# Patient Record
Sex: MALE | Race: WHITE | NOT HISPANIC OR LATINO | Employment: OTHER | URBAN - METROPOLITAN AREA
[De-identification: names, ages, dates, MRNs, and addresses within clinical notes are randomized per-mention and may not be internally consistent; named-entity substitution may affect disease eponyms.]

---

## 2022-04-04 NOTE — PROGRESS NOTES
Electrophysiology Clinic Consult     Shalom Jarvis  1955  [unfilled]  [unfilled]    04/06/22    DATE OF ADMISSION: (Not on file)  [facility] CARDIOLOGY    Roberto Fields,   [address] [facility] [address]  Referring Provider: Trung Borrego     Chief Complaint   Patient presents with   • Atrial Fibrillation     Referring provider: Dr. Borrego    Problem List: Afib, CHF, BSC BIV- ICD    1. Atrial Fibrillation   a. CHADSVASC: 4 (age, CHF, HTN, DM), on Xarelto  b. 11/23/21 ECV: unsuccessful with subsequent Amiodarone initiation   c. 3/16/22 ECV successful while on Amiodarone   2. NICM/CHF  a. C 4/6/2018: Ventriculography 25-30%, No obstructive CAD   b. 2018- Implant of [facility] DDD - ICD   c. Echo 5/3/21 LVEF 20-25%, severe global hypokinesis of the LV, LV chamber severely dilated.   d. 8/24/21 LVEF 30-35%, mod LA dilation, Mild MR and TR  e. 12/9/2021 upgrade to BIV ICD per Dr. Borrego   3. History of Conduction system disease   4. HTN  5. HLD  6. DM  7. COPD with home 2L 02 use at night   8. Chronic Back Pain   9. Surgical History   a. Right total knee replacement.     History of Present Illness:     Mr. Jarvis is a 66 year old male referred by Dr. Borrego for consultation for persistent Atrial Fibrillation. He has had history of NICM and Chronic systolic heart failure. He underwent initial ICD implant in 2018. Patient first diagnosed with AF in November 2021. Patient tells me he was at his regular f/u with Dr. Borrego where he was feeling more fatigued than usual, on [facility] device interrogation he was found to be at 100% AF (25% previously 3 months prior). He underwent ECV that was unsuccessful and ultimately underwent upgrade to BIV-ICD in Dec 2021 due to persistently low EF. He then underwent Amiodarone loading and initiation following his surgery and underwent successful ECV in March with Dr. Borrego. Patient is feeling better since maintaining NSR. He does have mild  SOB but relates to losing stamina. He also has arthritis. Denies any palps, LH/Dizziness. Doing well on xarelto without any bleeding issues. No CVA symptoms. BP stable at home. Overall patient is feeling better.     Caffeine-12 cups of coffee  Tobacco-1 1/2 ppd x 50 years   EtOh-none   Stimulants-none   RICHARD-never tested (naps frequently, does snore but is less than prior) / uses 02 at night  Illicit drug use - none     Allergies   Allergen Reactions   • Dulaglutide Other (See Comments)   • Shellfish Allergy Unknown (See Comments)        Cannot display prior to admission medications because the patient has not been admitted in this contact.            Current Outpatient Medications:   •  amiodarone (PACERONE) 200 MG tablet, Take 200 mg by mouth Daily., Disp: , Rfl:   •  atorvastatin (LIPITOR) 20 MG tablet, Take 20 mg by mouth Daily., Disp: , Rfl:   •  bumetanide (BUMEX) 2 MG tablet, Take 2 mg by mouth 2 (Two) Times a Day., Disp: , Rfl:   •  busPIRone (BUSPAR) 10 MG tablet, Take 10 mg by mouth 3 (Three) Times a Day., Disp: , Rfl:   •  carvedilol (COREG) 25 MG tablet, Take 25 mg by mouth 2 (Two) Times a Day., Disp: , Rfl:   •  diazePAM (VALIUM) 10 MG tablet, As Needed., Disp: , Rfl:   •  DULoxetine (CYMBALTA) 60 MG capsule, Take 60 mg by mouth Daily., Disp: , Rfl:   •  Entresto 49-51 MG tablet, Take 1 tablet by mouth 2 (Two) Times a Day., Disp: , Rfl:   •  fenofibrate (TRICOR) 145 MG tablet, Take 145 mg by mouth every night at bedtime., Disp: , Rfl:   •  insulin aspart (NovoLOG FlexPen) 100 UNIT/ML solution pen-injector sc pen, 3 (Three) Times a Day., Disp: , Rfl:   •  Insulin Degludec (Tresiba FlexTouch) 200 UNIT/ML solution pen-injector pen injection, Daily., Disp: , Rfl:   •  metFORMIN (GLUCOPHAGE) 1000 MG tablet, Take 1,000 mg by mouth 2 (Two) Times a Day., Disp: , Rfl:   •  multivitamin (MULTI-VITAMIN PO), Take 1 tablet by mouth Daily., Disp: , Rfl:   •  rivaroxaban (XARELTO) 20 MG tablet, Take 20 mg by mouth  "Daily., Disp: , Rfl:   •  spironolactone (ALDACTONE) 25 MG tablet, Take 25 mg by mouth 2 (Two) Times a Day., Disp: , Rfl:     Social History     Socioeconomic History   • Marital status:    Tobacco Use   • Smoking status: Current Some Day Smoker     Packs/day: 1.50   • Smokeless tobacco: Never Used   Substance and Sexual Activity   • Alcohol use: Not Currently   • Drug use: Never   • Sexual activity: Defer       Family History   Problem Relation Age of Onset   • Stroke Father        REVIEW OF SYSTEMS:   CONST:  No weight loss, fever, chills, weakness + fatigue.   HEENT:  No visual loss, blurred vision, double vision, yellow sclerae.                   No hearing loss, congestion, sore throat.   SKIN:      No rashes, urticaria, ulcers, sores.     RESP:     + shortness of breath,- hemoptysis, cough, sputum.   GI:           No anorexia, nausea, vomiting, diarrhea. No abdominal pain, melena.   :         No burning on urination, hematuria or increased frequency.  ENDO:    No diaphoresis, cold or heat intolerance. No polyuria or polydipsia.   NEURO:  No headache, dizziness, syncope, paralysis, ataxia, or parasthesias.                  No change in bowel or bladder control. No history of CVA/TIA  MUSC:    + muscle, back pain, joint pain and stiffness.   HEME:    No anemia, bleeding, bruising. No history of DVT/PE.  PSYCH:  No history of depression, anxiety    Vitals:    04/06/22 0959   BP: 104/64   BP Location: Left arm   Patient Position: Sitting   Pulse: 97   SpO2: 97%   Weight: (!) 154 kg (339 lb)   Height: 190.5 cm (75\")                 Physical Exam:  GEN: Well nourished, well-developed, no acute distress  HEENT: Normocephalic, atraumatic, PERRLA, moist mucous membranes  NECK: Supple, NO JVD, no thyromegaly, no lymphadenopathy  CARD: S1S2, RRR, + S3 + S4PMI not appreciated  LUNGS: Clear to auscultation, normal respiratory effort  ABDOMEN: Soft, nontender, normal bowel sounds  EXTREMITIES: No gross deformities, " no clubbing, cyanosis, + edema  SKIN: Warm, dry + ecchymoses  NEURO: No focal deficits, alert and oriented x 3  PSYCHIATRIC: Normal affect and mood      I personally viewed and interpreted the patient's EKG/Telemetry/lab data    No results found for: GLUCOSE, CALCIUM, NA, K, CO2, CL, BUN, CREATININE, EGFRIFAFRI, EGFRIFNONA, BCR, ANIONGAP  No results found for: WBC, HGB, HCT, MCV, PLT  No results found for: INR, PROTIME  No results found for: TSH, L7KMPIA, N4UALVO, THYROIDAB      Manual Device Interrogation: BSC BiV-ICD, DDDR @ 60, Rap 14%, RVp 100%. NSR (since 3/14) 6.5 years on battery.       ECG 12 Lead    Date/Time: 4/6/2022 10:22 AM  Performed by: Freddy Burgess MD  Authorized by: Freddy Burgess MD   Previous ECG: no previous ECG available  Rhythm: sinus rhythm  Rate: normal  BPM: 85  Comments: BiV paced              ICD-10-CM ICD-9-CM   1. Persistent atrial fibrillation (HCC)  I48.19 427.31   2. NICM (nonischemic cardiomyopathy) ([facility])  I42.8 425.4       Assessment and Plan:     1. Persistent Atrial Fibrillation   -CHADSVASC: 4 on xarelto.  Continue  -Found to be in 100% AF fall 2021 (up from 25% in August 2021). He underwent ECV last fall which was not successful. Repeat ECV March 2021 after Amiodarone loading was successful and he is now maintaining NSR. He feels better in NSR. Given his relatively young age and Hx of Lung disease Amiodarone is not a good long term option for him.   -Dr. Burgess discussed AF in detail with patient including symptoms, risks, and treatment options (tikosyn vs ablation). Would recommend stopping amiodarone at this time for wash out and initiate on Tikosyn. Will have patient f/u in [place] in 6 weeks and assess AF burden. The risks, benefits, and alternatives of the procedure have been reviewed and the patient wishes to proceed. Will assess how patient does on Tikosyn and can consider RFA in the future as indicated.     2. NICM/ Chronic Systolic Heart Failure/Class  II-III CHF  -8/24/21 LVEF 30-35%, mod LA dilation, Mild MR and TR  -Upgrade to BiV ICD in December 2021, no shocks.   -On Guideline Directed Medical including Coreg, Entresto, Bumex, Aldactone  -Normally functioning [employer] BIV- ICD.      3. HTN: stable on meds    F/u in 6 weeks in [facility]     Scribed for Freddy Burgess MD by NAN Vicente. 4/6/2022 10:22 EDT     I, Freddy Burgess MD, personally performed the services described in this documentation as scribed by the above named individual in my presence, and it is both accurate and complete.  4/6/2022  10:56 EDT

## 2022-04-06 ENCOUNTER — OFFICE VISIT (OUTPATIENT)
Dept: CARDIOLOGY | Facility: CLINIC | Age: 67
End: 2022-04-06

## 2022-04-06 VITALS
HEIGHT: 75 IN | SYSTOLIC BLOOD PRESSURE: 104 MMHG | BODY MASS INDEX: 39.17 KG/M2 | HEART RATE: 97 BPM | DIASTOLIC BLOOD PRESSURE: 64 MMHG | WEIGHT: 315 LBS | OXYGEN SATURATION: 97 %

## 2022-04-06 DIAGNOSIS — I10 PRIMARY HYPERTENSION: ICD-10-CM

## 2022-04-06 DIAGNOSIS — I42.8 NICM (NONISCHEMIC CARDIOMYOPATHY): ICD-10-CM

## 2022-04-06 DIAGNOSIS — I50.23 CHF (CONGESTIVE HEART FAILURE), NYHA CLASS III, ACUTE ON CHRONIC, SYSTOLIC: ICD-10-CM

## 2022-04-06 DIAGNOSIS — I48.19 PERSISTENT ATRIAL FIBRILLATION: Primary | ICD-10-CM

## 2022-04-06 PROCEDURE — 99204 OFFICE O/P NEW MOD 45 MIN: CPT | Performed by: INTERNAL MEDICINE

## 2022-04-06 PROCEDURE — 93284 PRGRMG EVAL IMPLANTABLE DFB: CPT | Performed by: INTERNAL MEDICINE

## 2022-04-06 PROCEDURE — 93000 ELECTROCARDIOGRAM COMPLETE: CPT | Performed by: INTERNAL MEDICINE

## 2022-04-06 RX ORDER — SPIRONOLACTONE 25 MG/1
25 TABLET ORAL 2 TIMES DAILY
COMMUNITY
Start: 2022-02-18

## 2022-04-06 RX ORDER — DULOXETIN HYDROCHLORIDE 60 MG/1
60 CAPSULE, DELAYED RELEASE ORAL DAILY
COMMUNITY
Start: 2022-04-02

## 2022-04-06 RX ORDER — BUMETANIDE 2 MG/1
2 TABLET ORAL 2 TIMES DAILY
COMMUNITY
Start: 2022-02-18

## 2022-04-06 RX ORDER — CARVEDILOL 25 MG/1
25 TABLET ORAL 2 TIMES DAILY
COMMUNITY
Start: 2022-01-28

## 2022-04-06 RX ORDER — DIPHENOXYLATE HYDROCHLORIDE AND ATROPINE SULFATE 2.5; .025 MG/1; MG/1
1 TABLET ORAL DAILY
COMMUNITY

## 2022-04-06 RX ORDER — FENOFIBRATE 145 MG/1
145 TABLET, COATED ORAL
COMMUNITY
Start: 2022-03-11

## 2022-04-06 RX ORDER — DIAZEPAM 10 MG/1
TABLET ORAL NIGHTLY PRN
COMMUNITY
Start: 2022-03-11

## 2022-04-06 RX ORDER — SACUBITRIL AND VALSARTAN 49; 51 MG/1; MG/1
1 TABLET, FILM COATED ORAL 2 TIMES DAILY
COMMUNITY
Start: 2022-03-10

## 2022-04-06 RX ORDER — AMIODARONE HYDROCHLORIDE 200 MG/1
200 TABLET ORAL DAILY
COMMUNITY
Start: 2022-02-10 | End: 2022-05-06 | Stop reason: ALTCHOICE

## 2022-04-06 RX ORDER — ATORVASTATIN CALCIUM 20 MG/1
20 TABLET, FILM COATED ORAL DAILY
COMMUNITY
Start: 2022-04-02

## 2022-04-06 RX ORDER — INSULIN ASPART 100 [IU]/ML
INJECTION, SOLUTION INTRAVENOUS; SUBCUTANEOUS 3 TIMES DAILY
COMMUNITY
Start: 2022-02-01

## 2022-04-06 RX ORDER — INSULIN DEGLUDEC 200 U/ML
50 INJECTION, SOLUTION SUBCUTANEOUS
COMMUNITY
Start: 2022-02-01

## 2022-04-06 RX ORDER — BUSPIRONE HYDROCHLORIDE 10 MG/1
10 TABLET ORAL 3 TIMES DAILY
COMMUNITY
Start: 2022-03-07

## 2022-05-06 ENCOUNTER — OFFICE VISIT (OUTPATIENT)
Dept: CARDIOLOGY | Facility: CLINIC | Age: 67
End: 2022-05-06

## 2022-05-06 VITALS
HEART RATE: 98 BPM | WEIGHT: 315 LBS | SYSTOLIC BLOOD PRESSURE: 138 MMHG | BODY MASS INDEX: 39.17 KG/M2 | DIASTOLIC BLOOD PRESSURE: 74 MMHG | OXYGEN SATURATION: 97 % | HEIGHT: 75 IN

## 2022-05-06 DIAGNOSIS — I50.22 CHRONIC SYSTOLIC CONGESTIVE HEART FAILURE: ICD-10-CM

## 2022-05-06 DIAGNOSIS — I48.19 ATRIAL FIBRILLATION, PERSISTENT: Primary | ICD-10-CM

## 2022-05-06 DIAGNOSIS — I10 PRIMARY HYPERTENSION: ICD-10-CM

## 2022-05-06 DIAGNOSIS — I42.0 DCM (DILATED CARDIOMYOPATHY): ICD-10-CM

## 2022-05-06 PROCEDURE — 99213 OFFICE O/P EST LOW 20 MIN: CPT | Performed by: INTERNAL MEDICINE

## 2022-05-06 PROCEDURE — 93284 PRGRMG EVAL IMPLANTABLE DFB: CPT | Performed by: INTERNAL MEDICINE

## 2022-05-06 NOTE — PROGRESS NOTES
Shalom NGUYEN Matheus  1955  [phone]    05/06/2022    [facility] CARDIOLOGY     Referring Provider: No ref. provider found     Roberto Fields DO  [address] [place] [address]    Chief Complaint   Patient presents with   • Atrial Fibrillation       Problem List:     1. Atrial Fibrillation   a. CHADSVASC: 4 (age, CHF, HTN, DM), on Xarelto  b. 11/23/21 ECV: unsuccessful with subsequent Amiodarone initiation   c. 3/16/22 ECV successful while on Amiodarone   2. NICM/CHF  a. [facility] 4/6/2018: Ventriculography 25-30%, No obstructive CAD   b. 2018- [place] [facility] [place]   c. Echo 5/3/21 LVEF 20-25%, severe global hypokinesis of the LV, LV chamber severely dilated.   d. 8/24/21 LVEF 30-35%, mod LA dilation, Mild MR and TR  e. 12/9/2021 upgrade to BIV ICD per Dr. Borrego   3. History of Conduction system disease   4. HTN  5. HLD  6. DM  7. COPD with home 2L 02 use at night   8. Chronic Back Pain   9. Surgical History   a. Right total knee replacement.     Allergies  Allergies   Allergen Reactions   • Dulaglutide Other (See Comments)   • Shellfish Allergy Unknown (See Comments)       Current Medications    Current Outpatient Medications:   •  atorvastatin (LIPITOR) 20 MG tablet, Take 20 mg by mouth Daily., Disp: , Rfl:   •  bumetanide (BUMEX) 2 MG tablet, Take 2 mg by mouth 2 (Two) Times a Day., Disp: , Rfl:   •  busPIRone (BUSPAR) 10 MG tablet, Take 10 mg by mouth 3 (Three) Times a Day., Disp: , Rfl:   •  carvedilol (COREG) 25 MG tablet, Take 25 mg by mouth 2 (Two) Times a Day., Disp: , Rfl:   •  diazePAM (VALIUM) 10 MG tablet, As Needed., Disp: , Rfl:   •  DULoxetine (CYMBALTA) 60 MG capsule, Take 60 mg by mouth Daily., Disp: , Rfl:   •  Entresto 49-51 MG tablet, Take 1 tablet by mouth 2 (Two) Times a Day., Disp: , Rfl:   •  fenofibrate (TRICOR) 145 MG tablet, Take 145 mg by mouth every night at bedtime., Disp: , Rfl:   •  insulin aspart (NovoLOG FlexPen) 100 UNIT/ML solution pen-injector  "sc pen, 3 (Three) Times a Day., Disp: , Rfl:   •  Insulin Degludec (Tresiba FlexTouch) 200 UNIT/ML solution pen-injector pen injection, Daily., Disp: , Rfl:   •  metFORMIN (GLUCOPHAGE) 1000 MG tablet, Take 1,000 mg by mouth 2 (Two) Times a Day., Disp: , Rfl:   •  multivitamin (THERAGRAN) tablet tablet, Take 1 tablet by mouth Daily., Disp: , Rfl:   •  rivaroxaban (XARELTO) 20 MG tablet, Take 20 mg by mouth Daily., Disp: , Rfl:   •  spironolactone (ALDACTONE) 25 MG tablet, Take 25 mg by mouth 2 (Two) Times a Day., Disp: , Rfl:     History of Present Illness     Pt presents for follow up of AF/CHF/DCM. Since we last saw the pt, pt stopped his amiodarone without difficulties.  He has had no tachypalpitations light has no syncope or syncope.  He does complain of persistent fatigue and generalized weakness.  He feels that his breathing is improved off of the amiodarone therapy.  No hospitalizations or ER visits.  Pressures been stable at home.    ROS:  General: + fatigue, no weight gain or loss  Cardiovascular:  Denies CP, PND, syncope, near syncope, edema or palpitations.  Pulmonary: + KOVACS, no cough, or wheezing      Vitals:    05/06/22 0956   BP: 138/74   BP Location: Left arm   Patient Position: Sitting   Pulse: 98   SpO2: 97%   Weight: (!) 150 kg (330 lb)   Height: 190.5 cm (75\")     Body mass index is 41.25 kg/m².  PE:  General: NAD  Neck: no JVD, no carotid bruits, no TM  Heart RRR, NL S1, S2, S4 present, no rubs, murmurs  Lungs: CTA, no wheezes, rhonchi, or rales  Abd: soft, non-tender, NL BS  Ext: No musculoskeletal deformities, + edema, No cyanosis, or clubbing  Psych: normal mood and affect    Diagnostic Data:    Manual Device Interrogation: BSC BiV-ICD, DDDR @ 60, Rap 16%, RVp 100%. NSR  6.5 years on battery.     Procedures    1. Atrial fibrillation, persistent (HCC)    2. DCM (dilated cardiomyopathy) (HCC)    3. Chronic systolic congestive heart failure (HCC)    4. Primary hypertension  "         Plan:    Assessment and Plan:      1. Persistent Atrial Fibrillation   -CHADSVASC: 4 on xarelto.   Maintaining sinus rhythm off amiodarone.  He is now been off amiodarone for approximately 5 weeks.  We discussed options with him today.  Tikosyn versus pulmonary vein ablation.  Patient like to pursue Tikosyn admission and therapy.  We will set this up in approximately 2 months.    2. NICM/ Chronic Systolic Heart Failure/Class II-III CHF  -8/24/21 LVEF 30-35%, mod LA dilation, Mild MR and TR  -Upgrade to BiV ICD in December 2021, no shocks.   -On Guideline Directed Medical including Coreg, Entresto, Bumex, Aldactone  Per Dr. Isaiah Bruno.     3. HTN: stable on meds    F/up in 6 months

## 2022-07-12 ENCOUNTER — HOSPITAL ENCOUNTER (INPATIENT)
Facility: HOSPITAL | Age: 67
LOS: 2 days | Discharge: HOME | End: 2022-07-14
Attending: INTERNAL MEDICINE | Admitting: INTERNAL MEDICINE

## 2022-07-12 PROBLEM — I48.91 AFIB: Status: ACTIVE | Noted: 2022-07-12

## 2022-07-12 LAB
ANION GAP SERPL CALCULATED.3IONS-SCNC: 10 MMOL/L (ref 5–15)
BUN SERPL-MCNC: 24 MG/DL (ref 8–23)
BUN/CREAT SERPL: 15.9 (ref 7–25)
CA-I SERPL ISE-MCNC: 1.32 MMOL/L (ref 1.12–1.32)
CALCIUM SPEC-SCNC: 9.4 MG/DL (ref 8.6–10.5)
CHLORIDE SERPL-SCNC: 101 MMOL/L (ref 98–107)
CO2 SERPL-SCNC: 26 MMOL/L (ref 22–29)
CREAT SERPL-MCNC: 1.51 MG/DL (ref 0.76–1.27)
EGFRCR SERPLBLD CKD-EPI 2021: 50.3 ML/MIN/1.73
GLUCOSE SERPL-MCNC: 236 MG/DL (ref 65–99)
INR PPP: 1.02 (ref 0.84–1.13)
MAGNESIUM SERPL-MCNC: 1.7 MG/DL (ref 1.6–2.4)
POTASSIUM SERPL-SCNC: 4.2 MMOL/L (ref 3.5–5.2)
PROTHROMBIN TIME: 13.3 SECONDS (ref 11.4–14.4)
SODIUM SERPL-SCNC: 137 MMOL/L (ref 136–145)

## 2022-07-12 PROCEDURE — 93010 ELECTROCARDIOGRAM REPORT: CPT | Performed by: INTERNAL MEDICINE

## 2022-07-12 PROCEDURE — 83735 ASSAY OF MAGNESIUM: CPT | Performed by: NURSE PRACTITIONER

## 2022-07-12 PROCEDURE — 93005 ELECTROCARDIOGRAM TRACING: CPT | Performed by: INTERNAL MEDICINE

## 2022-07-12 PROCEDURE — 93005 ELECTROCARDIOGRAM TRACING: CPT | Performed by: NURSE PRACTITIONER

## 2022-07-12 PROCEDURE — 63710000001 INSULIN LISPRO (HUMAN) PER 5 UNITS: Performed by: NURSE PRACTITIONER

## 2022-07-12 PROCEDURE — 80048 BASIC METABOLIC PNL TOTAL CA: CPT | Performed by: NURSE PRACTITIONER

## 2022-07-12 PROCEDURE — 85610 PROTHROMBIN TIME: CPT | Performed by: NURSE PRACTITIONER

## 2022-07-12 PROCEDURE — 82330 ASSAY OF CALCIUM: CPT | Performed by: NURSE PRACTITIONER

## 2022-07-12 RX ORDER — NICOTINE POLACRILEX 4 MG
15 LOZENGE BUCCAL
Status: DISCONTINUED | OUTPATIENT
Start: 2022-07-12 | End: 2022-07-14 | Stop reason: HOSPADM

## 2022-07-12 RX ORDER — POTASSIUM CHLORIDE 7.45 MG/ML
10 INJECTION INTRAVENOUS
Status: DISCONTINUED | OUTPATIENT
Start: 2022-07-12 | End: 2022-07-12 | Stop reason: SDUPTHER

## 2022-07-12 RX ORDER — MAGNESIUM SULFATE HEPTAHYDRATE 40 MG/ML
2 INJECTION, SOLUTION INTRAVENOUS AS NEEDED
Status: DISCONTINUED | OUTPATIENT
Start: 2022-07-12 | End: 2022-07-12 | Stop reason: SDUPTHER

## 2022-07-12 RX ORDER — MAGNESIUM SULFATE HEPTAHYDRATE 40 MG/ML
2 INJECTION, SOLUTION INTRAVENOUS AS NEEDED
Status: DISCONTINUED | OUTPATIENT
Start: 2022-07-12 | End: 2022-07-14 | Stop reason: HOSPADM

## 2022-07-12 RX ORDER — CARVEDILOL 12.5 MG/1
25 TABLET ORAL 2 TIMES DAILY
Status: DISCONTINUED | OUTPATIENT
Start: 2022-07-12 | End: 2022-07-14 | Stop reason: HOSPADM

## 2022-07-12 RX ORDER — NICOTINE 21 MG/24HR
1 PATCH, TRANSDERMAL 24 HOURS TRANSDERMAL
Status: DISCONTINUED | OUTPATIENT
Start: 2022-07-12 | End: 2022-07-14 | Stop reason: HOSPADM

## 2022-07-12 RX ORDER — SPIRONOLACTONE 25 MG/1
25 TABLET ORAL 2 TIMES DAILY
Status: DISCONTINUED | OUTPATIENT
Start: 2022-07-12 | End: 2022-07-14 | Stop reason: HOSPADM

## 2022-07-12 RX ORDER — POTASSIUM CHLORIDE 1.5 G/1.77G
40 POWDER, FOR SOLUTION ORAL AS NEEDED
Status: DISCONTINUED | OUTPATIENT
Start: 2022-07-12 | End: 2022-07-14 | Stop reason: HOSPADM

## 2022-07-12 RX ORDER — MAGNESIUM SULFATE HEPTAHYDRATE 40 MG/ML
4 INJECTION, SOLUTION INTRAVENOUS AS NEEDED
Status: DISCONTINUED | OUTPATIENT
Start: 2022-07-12 | End: 2022-07-12 | Stop reason: SDUPTHER

## 2022-07-12 RX ORDER — INSULIN LISPRO 100 [IU]/ML
0-24 INJECTION, SOLUTION INTRAVENOUS; SUBCUTANEOUS
Status: DISCONTINUED | OUTPATIENT
Start: 2022-07-12 | End: 2022-07-14 | Stop reason: HOSPADM

## 2022-07-12 RX ORDER — POTASSIUM CHLORIDE 750 MG/1
40 CAPSULE, EXTENDED RELEASE ORAL AS NEEDED
Status: DISCONTINUED | OUTPATIENT
Start: 2022-07-12 | End: 2022-07-14 | Stop reason: HOSPADM

## 2022-07-12 RX ORDER — DIAZEPAM 5 MG/1
10 TABLET ORAL NIGHTLY
Status: DISCONTINUED | OUTPATIENT
Start: 2022-07-12 | End: 2022-07-14 | Stop reason: HOSPADM

## 2022-07-12 RX ORDER — ATORVASTATIN CALCIUM 20 MG/1
20 TABLET, FILM COATED ORAL NIGHTLY
Status: DISCONTINUED | OUTPATIENT
Start: 2022-07-12 | End: 2022-07-14 | Stop reason: HOSPADM

## 2022-07-12 RX ORDER — BUMETANIDE 2 MG/1
2 TABLET ORAL 2 TIMES DAILY
Status: DISCONTINUED | OUTPATIENT
Start: 2022-07-12 | End: 2022-07-14 | Stop reason: HOSPADM

## 2022-07-12 RX ORDER — DOFETILIDE 0.5 MG/1
500 CAPSULE ORAL EVERY 12 HOURS
Status: DISCONTINUED | OUTPATIENT
Start: 2022-07-12 | End: 2022-07-14 | Stop reason: HOSPADM

## 2022-07-12 RX ORDER — MAGNESIUM SULFATE HEPTAHYDRATE 40 MG/ML
4 INJECTION, SOLUTION INTRAVENOUS AS NEEDED
Status: DISCONTINUED | OUTPATIENT
Start: 2022-07-12 | End: 2022-07-14 | Stop reason: HOSPADM

## 2022-07-12 RX ORDER — BUSPIRONE HYDROCHLORIDE 10 MG/1
10 TABLET ORAL 3 TIMES DAILY
Status: DISCONTINUED | OUTPATIENT
Start: 2022-07-12 | End: 2022-07-14 | Stop reason: HOSPADM

## 2022-07-12 RX ORDER — DEXTROSE MONOHYDRATE 25 G/50ML
25 INJECTION, SOLUTION INTRAVENOUS
Status: DISCONTINUED | OUTPATIENT
Start: 2022-07-12 | End: 2022-07-14 | Stop reason: HOSPADM

## 2022-07-12 RX ORDER — DULOXETIN HYDROCHLORIDE 60 MG/1
60 CAPSULE, DELAYED RELEASE ORAL DAILY
Status: DISCONTINUED | OUTPATIENT
Start: 2022-07-12 | End: 2022-07-14 | Stop reason: HOSPADM

## 2022-07-12 RX ADMIN — INSULIN LISPRO 8 UNITS: 100 INJECTION, SOLUTION INTRAVENOUS; SUBCUTANEOUS at 19:09

## 2022-07-12 RX ADMIN — SPIRONOLACTONE 25 MG: 25 TABLET ORAL at 22:14

## 2022-07-12 RX ADMIN — BUMETANIDE 2 MG: 2 TABLET ORAL at 22:13

## 2022-07-12 RX ADMIN — DIAZEPAM 10 MG: 5 TABLET ORAL at 22:14

## 2022-07-12 RX ADMIN — BUSPIRONE HYDROCHLORIDE 10 MG: 10 TABLET ORAL at 17:45

## 2022-07-12 RX ADMIN — Medication 1 PATCH: at 22:10

## 2022-07-12 RX ADMIN — BUSPIRONE HYDROCHLORIDE 10 MG: 10 TABLET ORAL at 22:14

## 2022-07-12 RX ADMIN — ATORVASTATIN CALCIUM 20 MG: 20 TABLET, FILM COATED ORAL at 22:13

## 2022-07-12 RX ADMIN — DOFETILIDE 500 MCG: 0.5 CAPSULE ORAL at 17:45

## 2022-07-12 RX ADMIN — SACUBITRIL AND VALSARTAN 1 TABLET: 49; 51 TABLET, FILM COATED ORAL at 22:16

## 2022-07-12 RX ADMIN — RIVAROXABAN 20 MG: 20 TABLET, FILM COATED ORAL at 17:45

## 2022-07-12 NOTE — PROGRESS NOTES
Pharmacy Consult - Tikosyn Initiation    Shalom Jarvis is a 66 y.o. male admitted for Tikosyn initiation and monitoring.    Height:    Weight:      Evaluation of Drug-Drug Interactions     Previous antiarrhythmic medications must be discontinued prior to admission       - Amiodarone must be discontinued >3 weeks prior to Tikosyn start       - Sotalol and class I antiarrhythmics (Dysopyramide, Flecainide, Mexiletine, Propafenone) must be discontinued >48 hours prior to Tikosyn start         - Previous antiarrhythmic therapy: none     Current drug-drug interactions noted with admission medications and Tikosyn    The following medications are contraindicated with Dofetilide and will be/have been discontinued:  - Fluoroquinolones (Ciprofloxacin, Levofloxacin, Moxifloxacin, Norfloxacin)  - Azole antifungals (Itraconazole, Ketoconazole, Posaconazole)  - Hydrochlorothiazide and any combination products containing Hydrochlorothiazide   - Trimethoprim and Trimethoprim-Sulfamethoxazole   - Verapamil  - Cimetidine  - Ziprasidone   - Dolutegravir  - Sauinavir  - Thioridazine   - Fingolimod  - Megestrol  - Pimozide  - Prochloperazine    The following medications are recognized to prolong QT interval, however the medication continue during initial Dofetilide dosing:  - Loop diuretics (Bumetanide, Furosemide, Torsemide)  - Antipsychotics (Haloperidol, Quetiapine, Risperidone, Asenapine)   - Antidepressants (Amitriptyline, Amoxapine, Clomipramine, Desipramine, Doxepin, Imipramine, Maprotiline, Mirtazapine, Nortriptyline, Protriptyline, Triipramine)  - Diltiazem  - Ondansetron  - Ivabradine  - Procainamide  - Eribulin  - Paliperidone  - Phenothiazines (Chlorpromazine, Fluphenazine, Mesoridazine, Perphenazine, Promazine, Trifluoperazine)    The following medications may increase Dofetilide serum concentrations and can be co-administered:  - Azole antifungals (Fluconazole, Voriconazole)  - Macrolide antibiotics (Erythromycin,  Clarithromycin)  - SSRI's (Citalopram, Escitalopram, Fluvoxamine, Fluoxetine, Paroxetine, Sertraline)  - Protease Inhibitors (Amprenavir, Indinavir, Nelfinavir, Ritonavir)  - Diltiazem   - Metformin, Glyburide  - Amiloride  - Cannabinoids  - Nefazodone  - Triamterene  - Quinine  - Lamotrigine  - Ibutilide    Laboratory    Results from last 7 days   Lab Units 07/12/22  1623   SODIUM mmol/L 137   POTASSIUM mmol/L 4.2   CHLORIDE mmol/L 101   CO2 mmol/L 26.0   BUN mg/dL 24*   CREATININE mg/dL 1.51*   GLUCOSE mg/dL 236*   CALCIUM mg/dL 9.4     Results from last 7 days   Lab Units 07/12/22  1623   MAGNESIUM mg/dL 1.7       Pharmacy will order electrolyte replacement per protocols if indicated (Mag < 2.0, K < 4.0) based on laboratory values on admission      - Magnesium replacement protocol ordered: Yes     - Potassium replacement protocol ordered: Yes    Estimated CrCl =  100 mL/min  (Calculated with Cockroft-Gault equation using actual body weight and serum creatinine for calculation--can use laboratory values from previous 24 hours)    Initial QTc and EKG Monitoring    QTc = 542     Patient has a functioning atrial pacemaker - Yes     Cardiology aware, will proceed    Initial Tikosyn dose based on Creatinine Clearance:    CrCl > 60 mL/min - Dofetilide 500 mcg PO Q12H  CrCl 40-60 mL/min - Dofetilide 250 mcg PO Q12H  CrCl 20-39 mL/min - Dofetilide 125 mcg PO Q12H  CrCl < 20 mL/min - Do not start medication, contact MD    (Will dose medication 10 hour intervals until administration times are between 7 and 9).    Assessment/Plan:    Patient admitted for Tikosyn initiation per cardiology. Will initiate Tikosyn 500 mcg PO every 12 hours.  Monitor electrolytes and drug-drug interactions.  Pharmacy will continue to follow.  Mark Lopez, PharmMUKESH  7/12/2022  17:23 EDT

## 2022-07-12 NOTE — PLAN OF CARE
Problem: Adult Inpatient Plan of Care  Goal: Plan of Care Review  Outcome: Ongoing, Progressing     Problem: Impaired Wound Healing  Goal: Optimal Wound Healing  Outcome: Ongoing, Progressing   Goal Outcome Evaluation:               Oriented patient and spouse to room. Telemetry placed. WOC consulted for weeping wound to LLE.

## 2022-07-12 NOTE — H&P
"[place] [facility] Consult/H&P Note      Referring Provider: Freddy Burgess MD  Primary Provider:  Roberto Fields DO  Reason for Consultation: Afib    PROBLEM LIST:  1. Atrial Fibrillation   a. CHADSVASC: 4 (age, CHF, HTN, DM), on Xarelto  b. 11/23/21 ECV: unsuccessful with subsequent Amiodarone initiation   c. 3/16/22 ECV successful while on Amiodarone   2. NICM/CHF  a. C 4/6/2018: Ventriculography 25-30%, No obstructive CAD   b. 2018- Implant of [facility] DDD - ICD   c. Echo 5/3/21 LVEF 20-25%, severe global hypokinesis of the LV, LV chamber severely dilated.   d. 8/24/21 LVEF 30-35%, mod LA dilation, Mild MR and TR  e. 12/9/2021 upgrade to BIV ICD per Dr. Borrego   3. History of Conduction system disease   4. HTN  5. HLD  6. DM  7. COPD with home 2L 02 use at night   8. Chronic Back Pain   9. Surgical History   a. Right total knee replacement.       HISTORY OF PRESENT ILLNESS:  Shalom Jarvis is a 66 y.o. male with the above noted pmhx who presents today for Tikosyn initiation. He has a known history of atrial fibrillation. He was treated with amiodarone from November to April 2022 at which time patient was given the option to discontinued amiodarone in favor of using Tikosyn versus PVA. Patient opted to proceed with Tikosyn initiation. He has done well since then. He is anticoagulated with Xarelto. He may have missed a few doses in the last month. No major bleeding/bruising on this. He did not get recommended sleep study. Patient reports having LLE wound for several days. He was seen by his PCP who diagnosed him with venous \"something\". He has been wrapping it with a microfiber cloth.    REVIEW OF SYSTEMS  Pertinent positives are listed in the HPI and all other ROS are negative.      SOCIAL HISTORY:   reports that he has been smoking. He has been smoking about 1.50 packs per day. He has never used smokeless tobacco. He reports previous alcohol use. He reports that he does not use drugs.     FAMILY " HISTORY:  family history includes Stroke in his father.     CURRENT MEDICATIONS:      Current Facility-Administered Medications:   •  Magnesium Sulfate 2 gram Bolus, followed by 8 gram infusion (total Mg dose 10 grams)- Mg less than or equal to 1mg/dL, 2 g, Intravenous, PRN **OR** Magnesium Sulfate 2 gram / 50mL Infusion (GIVE X 3 BAGS TO EQUAL 6GM TOTAL DOSE) - Mg 1.1 - 1.5 mg/dl, 2 g, Intravenous, PRN **OR** Magnesium Sulfate 4 gram infusion- Mg 1.6-1.9 mg/dL, 4 g, Intravenous, PRN, Kuns-Power, Wilma B, APRN  •  Pharmacy Consult, , Does not apply, Once PRN, Kuns-Power, Wilma B, APRN  •  potassium chloride (MICRO-K) CR capsule 40 mEq, 40 mEq, Oral, PRN **OR** potassium chloride (KLOR-CON) packet 40 mEq, 40 mEq, Oral, PRN, Kuns-Power, Wilma B, APRN     Allergies:  Dulaglutide and Shellfish allergy    Objective     Vital Sign Min/Max for last 24 hours  No data recorded   No data recorded   No data recorded   No data recorded   No data recorded   No data recorded   No data recorded     PHYSICAL EXAM:  GENERAL: This is a well-developed, well-nourished, male who is in no acute distress.   SKIN: Pink and warm. Erythema along left lower extremity with weeping edema  HEENT: Head is normocephalic and atraumatic. Pupils are equal and reactive to light bilaterally. Mucous membranes are pink and moist.   NECK: Supple without lymphadenopathy or thyromegaly. There is no jugular venous distention at 30°.  LUNGS: Clear to auscultation bilaterally without wheezing, rhonchi, or rales noted.   CARDIOVASCULAR: The heart has a regular rate with a normal S1 and S2. There is no murmur, gallop, rub, or click appreciated. The PMI is nondisplaced. Carotid upstrokes are 2+ and symmetrical without bruits.   ABDOMEN: Soft and nondistended with positive bowel sounds x4. The patient denies tenderness of palpitation.   MUSCULOSKELETAL: There are no obvious bony abnormalities. Normal range of tenderness to palpation.   NEUROLOGICAL:  Nonfocal. Alert and oriented x3.   PERIPHERAL VASCULAR: Femoral pulses are 2+ and symmetrical without bruits. Posterior tibial and dorsalis pedis pulses are 2+ and symmetrical. There is 2+ peripheral edema.   PSYCH: Normal mood and affect.      EKG:  NSR with PACs, V paced, 93 bpm, QTc 498 ms,  ms  Tele: Vpaced, occ A paced.    LABS:    No results found for: WBC, HGB, HCT, MCV, PLT  No results found for: GLUCOSE, BUN, CREATININE, EGFRIFNONA, EGFRIFAFRI, BCR, K, CO2, CALCIUM, PROTENTOTREF, ALBUMIN, LABIL2, BILIRUBIN, AST, ALT  No results found for: CKTOTAL, CKMB, CKMBINDEX, TROPONINI, TROPONINT  No results found for: INR, PROTIME  No results found for: CHOL, CHLPL, TRIG, HDL, LDL, LDLDIRECT     Results Review: I reviewed the patient's new clinical results.      ASSESSMENT:    1.  Atrial Fibrillation   a. CHADSVASC: 4 (age, CHF, HTN, DM), on Xarelto  b. 11/23/21 ECV: unsuccessful with subsequent Amiodarone initiation   c. 3/16/22 ECV successful while on Amiodarone   d. Discontinued Amiodarone 4/2022 to allow for washout in favor of using less toxic AAD  e. Patient admitted 7/12/22 for Tikosyn initiation  2. NICM/CHF  a. [facility] 4/6/2018: left Ventriculography 25-30%, No obstructive CAD   b. [place] [facility] [place]   c. Echo 5/3/21 LVEF 20-25%, severe global hypokinesis of the LV, LV chamber severely dilated.   d. 8/24/21 LVEF 30-35%, mod LA dilation, Mild MR and TR  e. 12/9/2021 upgrade to BIV ICD per Dr. Borrego   3. HTN  4. HLD  5. DM  6. COPD on nocturnal O2      PLAN:    1. QTc 498, paced with QRS of 150 ms  CrCl pending, Start Tikosyn at _ mcg bid.   2. EKG every 2-3 hours after each dose  3. Monitor for 5 doses of Tikosyn  4. Continue Xarelto for stroke prevention.   5. Continue Coreg, Entresto, Aldactone for HTN, NICMP  6. SSI, FSBG AC HS      Electronically signed by NAN Moon, 07/12/22, 3:16 PM EDT.     I have read the above note and agree

## 2022-07-13 LAB
GLUCOSE BLDC GLUCOMTR-MCNC: 213 MG/DL (ref 70–130)
GLUCOSE BLDC GLUCOMTR-MCNC: 220 MG/DL (ref 70–130)
GLUCOSE BLDC GLUCOMTR-MCNC: 255 MG/DL (ref 70–130)
GLUCOSE BLDC GLUCOMTR-MCNC: 272 MG/DL (ref 70–130)
GLUCOSE BLDC GLUCOMTR-MCNC: 405 MG/DL (ref 70–130)
MAGNESIUM SERPL-MCNC: 1.7 MG/DL (ref 1.6–2.4)
POTASSIUM SERPL-SCNC: 4.6 MMOL/L (ref 3.5–5.2)
QT INTERVAL: 436 MS
QT INTERVAL: 454 MS
QT INTERVAL: 486 MS
QTC INTERVAL: 542 MS
QTC INTERVAL: 552 MS
QTC INTERVAL: 571 MS

## 2022-07-13 PROCEDURE — 93010 ELECTROCARDIOGRAM REPORT: CPT | Performed by: INTERNAL MEDICINE

## 2022-07-13 PROCEDURE — 93005 ELECTROCARDIOGRAM TRACING: CPT | Performed by: INTERNAL MEDICINE

## 2022-07-13 PROCEDURE — 84132 ASSAY OF SERUM POTASSIUM: CPT | Performed by: NURSE PRACTITIONER

## 2022-07-13 PROCEDURE — 93005 ELECTROCARDIOGRAM TRACING: CPT | Performed by: NURSE PRACTITIONER

## 2022-07-13 PROCEDURE — 83735 ASSAY OF MAGNESIUM: CPT | Performed by: NURSE PRACTITIONER

## 2022-07-13 PROCEDURE — 0 MAGNESIUM SULFATE 4 GM/100ML SOLUTION: Performed by: NURSE PRACTITIONER

## 2022-07-13 PROCEDURE — 63710000001 INSULIN LISPRO (HUMAN) PER 5 UNITS: Performed by: NURSE PRACTITIONER

## 2022-07-13 PROCEDURE — 99232 SBSQ HOSP IP/OBS MODERATE 35: CPT | Performed by: INTERNAL MEDICINE

## 2022-07-13 PROCEDURE — 82962 GLUCOSE BLOOD TEST: CPT

## 2022-07-13 RX ADMIN — SACUBITRIL AND VALSARTAN 1 TABLET: 49; 51 TABLET, FILM COATED ORAL at 09:41

## 2022-07-13 RX ADMIN — BUMETANIDE 2 MG: 2 TABLET ORAL at 09:41

## 2022-07-13 RX ADMIN — INSULIN LISPRO 8 UNITS: 100 INJECTION, SOLUTION INTRAVENOUS; SUBCUTANEOUS at 18:03

## 2022-07-13 RX ADMIN — BUMETANIDE 2 MG: 2 TABLET ORAL at 21:47

## 2022-07-13 RX ADMIN — SPIRONOLACTONE 25 MG: 25 TABLET ORAL at 09:41

## 2022-07-13 RX ADMIN — BUSPIRONE HYDROCHLORIDE 10 MG: 10 TABLET ORAL at 09:41

## 2022-07-13 RX ADMIN — BUSPIRONE HYDROCHLORIDE 10 MG: 10 TABLET ORAL at 21:47

## 2022-07-13 RX ADMIN — INSULIN LISPRO 12 UNITS: 100 INJECTION, SOLUTION INTRAVENOUS; SUBCUTANEOUS at 13:30

## 2022-07-13 RX ADMIN — SACUBITRIL AND VALSARTAN 1 TABLET: 49; 51 TABLET, FILM COATED ORAL at 21:47

## 2022-07-13 RX ADMIN — BUSPIRONE HYDROCHLORIDE 10 MG: 10 TABLET ORAL at 15:07

## 2022-07-13 RX ADMIN — INSULIN LISPRO 8 UNITS: 100 INJECTION, SOLUTION INTRAVENOUS; SUBCUTANEOUS at 09:42

## 2022-07-13 RX ADMIN — DULOXETINE HYDROCHLORIDE 60 MG: 60 CAPSULE, DELAYED RELEASE ORAL at 09:41

## 2022-07-13 RX ADMIN — Medication 1 PATCH: at 09:42

## 2022-07-13 RX ADMIN — DIAZEPAM 10 MG: 5 TABLET ORAL at 21:47

## 2022-07-13 RX ADMIN — ATORVASTATIN CALCIUM 20 MG: 20 TABLET, FILM COATED ORAL at 21:47

## 2022-07-13 RX ADMIN — DOFETILIDE 500 MCG: 0.5 CAPSULE ORAL at 06:10

## 2022-07-13 RX ADMIN — RIVAROXABAN 20 MG: 20 TABLET, FILM COATED ORAL at 18:03

## 2022-07-13 RX ADMIN — MAGNESIUM SULFATE HEPTAHYDRATE 4 G: 40 INJECTION, SOLUTION INTRAVENOUS at 19:32

## 2022-07-13 RX ADMIN — DOFETILIDE 500 MCG: 0.5 CAPSULE ORAL at 18:03

## 2022-07-13 RX ADMIN — CARVEDILOL 25 MG: 12.5 TABLET, FILM COATED ORAL at 09:41

## 2022-07-13 RX ADMIN — SPIRONOLACTONE 25 MG: 25 TABLET ORAL at 21:47

## 2022-07-13 NOTE — NURSING NOTE
Patient seen today for weeping lower left extremity    Patient presents with a weeping venous ulcer in the gaiter region.  Skin is red flaky.  Sock on patient's foot was extremely wet.  Patient's wife is using some kind of OPTi fabric that they buy from [employer].  Woc cleansed wound with normal saline applied Xeroform wrap and ABD pad and wrapped with Kerlix.  PT Woc consulted for compression.    Patient states he has had Unna boots before but due to his restless leg syndrome and pain he could not tolerate.  States he states he will not ever do Unna boots again.  Discussed option of mild compression or medium compression patient is amiable so PT order placed.  Either way patient has a heavily draining venous wound and needs compression.

## 2022-07-13 NOTE — PROGRESS NOTES
[facility] at [facility]  Progress Note     LOS: 1 day   Patient Care Team:  Roberto Fields DO as PCP - General (Family Medicine)  Trung Borrego Jr., MD as Consulting Physician (Interventional Cardiology)    Chief Complaint:  Atrial Fibrillation    Subjective     Interval History:   Patient resting in chair, did well overnight. No side effects from Tikosyn. VSS. Denies cp, palps, lh/dizziness.  No new complaints at this time.      Review of Systems:   Pertinent positives in HPI, all others reviewed and negative.      Objective       Current Facility-Administered Medications:   •  atorvastatin (LIPITOR) tablet 20 mg, 20 mg, Oral, Nightly, Kuns-Power, Wilma B, APRN, 20 mg at 07/12/22 2213  •  bumetanide (BUMEX) tablet 2 mg, 2 mg, Oral, BID, Kuns-Power, Wilma B, APRN, 2 mg at 07/12/22 2213  •  busPIRone (BUSPAR) tablet 10 mg, 10 mg, Oral, TID, Kuns-Power, Wilma B, APRN, 10 mg at 07/12/22 2214  •  carvedilol (COREG) tablet 25 mg, 25 mg, Oral, BID, Kuns-Power, Wilma B, APRN  •  dextrose (D50W) (25 g/50 mL) IV injection 25 g, 25 g, Intravenous, Q15 Min PRN, Kuns-Power, Wilma B, APRN  •  dextrose (GLUTOSE) oral gel 15 g, 15 g, Oral, Q15 Min PRN, Kuns-Power, Wilma B, APRN  •  diazePAM (VALIUM) tablet 10 mg, 10 mg, Oral, Nightly, Kuns-Power, Wilma B, APRN, 10 mg at 07/12/22 2214  •  dofetilide (TIKOSYN) capsule 500 mcg, 500 mcg, Oral, Q12H, Mark Lopez, PharmD, 500 mcg at 07/13/22 0610  •  DULoxetine (CYMBALTA) DR capsule 60 mg, 60 mg, Oral, Daily, Kuns-Power, Wilma B, APRN  •  glucagon (human recombinant) (GLUCAGEN DIAGNOSTIC) injection 1 mg, 1 mg, Intramuscular, Q15 Min PRN, Kuns-Power, Wilma B, APRN  •  Insulin Lispro (humaLOG) injection 0-24 Units, 0-24 Units, Subcutaneous, TID Yeni TOWNSEND Candice B, APRN, 8 Units at 07/12/22 1909  •  Magnesium Sulfate 2 gram Bolus, followed by 8 gram infusion (total Mg dose 10 grams)- Mg less than or equal to 1mg/dL, 2 g,  "Intravenous, PRN **OR** Magnesium Sulfate 2 gram / 50mL Infusion (GIVE X 3 BAGS TO EQUAL 6GM TOTAL DOSE) - Mg 1.1 - 1.5 mg/dl, 2 g, Intravenous, PRN **OR** Magnesium Sulfate 4 gram infusion- Mg 1.6-1.9 mg/dL, 4 g, Intravenous, PRN, Kuns-Power, Wilma B, APRN  •  nicotine (NICODERM CQ) 21 MG/24HR patch 1 patch, 1 patch, Transdermal, Q24H, Kuns-Power, Wilma B, APRN, 1 patch at 07/12/22 2210  •  Pharmacy Consult, , Does not apply, Once PRN, Kuns-Power, Wilma B, APRN  •  potassium chloride (MICRO-K) CR capsule 40 mEq, 40 mEq, Oral, PRN **OR** potassium chloride (KLOR-CON) packet 40 mEq, 40 mEq, Oral, PRN, Kuns-Power, Wilma B, APRN  •  rivaroxaban (XARELTO) tablet 20 mg, 20 mg, Oral, Daily With Dinner, Kuns-Power, Wilma B, APRN, 20 mg at 07/12/22 1745  •  sacubitril-valsartan (ENTRESTO) 49-51 MG tablet 1 tablet, 1 tablet, Oral, BID, Kuns-Power, Wilma B, APRN, 1 tablet at 07/12/22 2216  •  spironolactone (ALDACTONE) tablet 25 mg, 25 mg, Oral, BID, Kuns-Power, Wilma B, APRN, 25 mg at 07/12/22 2214      Vital Sign Min/Max for last 24 hours  Temp  Min: 97.6 °F (36.4 °C)  Max: 98.4 °F (36.9 °C)   BP  Min: 100/73  Max: 118/55   Pulse  Min: 66  Max: 111   Resp  Min: 18  Max: 20   SpO2  Min: 83 %  Max: 96 %   No data recorded   Weight  Min: 154 kg (340 lb)  Max: 154 kg (340 lb)     Flowsheet Rows    Flowsheet Row First Filed Value   Admission Height 190.5 cm (75\") Documented at 07/12/2022 1445   Admission Weight 154 kg (340 lb) Documented at 07/12/2022 1445          Physical Exam:     General Appearance:    Alert, cooperative, in no acute distress   Lungs:    Clear to auscultation bilaterally.  Respiratory effort is normal.    Heart:   Regular rhythm normal S1-S2.  There is an S4.  No murmurs.   Chest Wall:    No abnormalities observed   Abdomen:     Normal bowel sounds, benign examination.  The   Extremities:   Moves all extremities well, no edema, no cyanosis, no             redness   Pulses:   Pulses " palpable and equal bilaterally   Skin:   No bleeding, bruising or rash        Results Review:       Results from last 7 days   Lab Units 07/13/22  0637 07/12/22  1623   SODIUM mmol/L  --  137   POTASSIUM mmol/L 4.6 4.2   CHLORIDE mmol/L  --  101   CO2 mmol/L  --  26.0   BUN mg/dL  --  24*   CREATININE mg/dL  --  1.51*   GLUCOSE mg/dL  --  236*          Results from last 7 days   Lab Units 07/12/22  1623   PROTIME Seconds 13.3   INR  1.02         Magnesium:   Ionized Calcium:    No intake or output data in the 24 hours ending 07/13/22 0825    I personally viewed and interpreted the patient's EKG/Telemetry data      EKG: BiV paced  / QTC: 465 ms    Telemetry: Normal sinus rhythm/BiV paced  bpm      Present on Admission:  • Afib ([facility])    Assessment & Plan        1.  Atrial Fibrillation   a. CHADSVASC: 4 (age, CHF, HTN, DM), on Xarelto, continue   b. 3/16/22 ECV successful while on Amiodarone   c. Discontinued Amiodarone 4/2022 to allow for washout in favor of using less toxic AAD  d. Patient admitted 7/12/22 for Tikosyn initiation, initiated on 500 mcg of Tikosyn. QTc 465 ms this am, continue. The patient received education about Tikosyn and potential medication interactions and QT prolonging meds.  Maintaining sinus rhythm.  2. NICM/CHF  a. C 4/6/2018: left Ventriculography 25-30%, No obstructive CAD   b. 2018- Implant of [facility] [place] - ICD   c. Echo 5/3/21 LVEF 20-25%, severe global hypokinesis of the LV, LV chamber severely dilated.   d. 8/24/21 LVEF 30-35%, mod LA dilation, Mild MR and TR  e. 12/9/2021 upgrade to BIV ICD per Dr. Borrego normal BiV ICD function.  3. HTN  1. Continue coreg, entresto, aldactone  4. HLD  1. Continue statin   5. COPD on nocturnal O2    Monitor QTc closely, replete electrolytes as indicated per protocol. Continue Tikosyn.     Electronically signed by NAN Eaton, 07/13/22, 8:25 AM EDT.      I, Freddy Burgess MD, personally performed the services face to face as  described and documented by the above named individual. I have made any necessary edits and it is both accurate and complete 7/13/2022  17:03 EDT

## 2022-07-13 NOTE — CASE MANAGEMENT/SOCIAL WORK
Discharge Planning Assessment  [facility]     Patient Name: Shalom Jarvis  MRN: [id number]  Today's Date: 7/13/2022    Admit Date: 7/12/2022     Discharge Needs Assessment     Row Name 07/13/22 1037       Living Environment    People in Home spouse    Current Living Arrangements home    Primary Care Provided by self    Provides Primary Care For no one    Family Caregiver if Needed spouse    Quality of Family Relationships unable to assess    Able to Return to Prior Arrangements yes       Resource/Environmental Concerns    Resource/Environmental Concerns none       Transition Planning    Patient/Family Anticipates Transition to home with family    Patient/Family Anticipated Services at Transition     Transportation Anticipated family or friend will provide       Discharge Needs Assessment    Readmission Within the Last 30 Days no previous admission in last 30 days    Equipment Currently Used at Home cane, straight;walker, rolling;oxygen;rollator    Concerns to be Addressed discharge planning    Anticipated Changes Related to Illness none    Equipment Needed After Discharge none               Discharge Plan     Row Name 07/13/22 1040       Plan    Plan Home    Patient/Family in Agreement with Plan yes    Plan Comments Spoke with patient in room to initiate discharge planning.  He lives with his wife in [facility] [place]. He is independent with ADL's, using a straight cane to assist with ambulation.  He also has a rollator, rolling walker and home O[place] pr[place] [facility]  His PCP is Roberto Fields.  He does not have an advanced directive.  Mr. Jarvis has RX coverage and has his scripts filled at [facility].  His plan is to return home at discharge.  He is agreeable to first supply of Tikosyn through [facility].  Pharmacy consulted for Med to Beds.  CM will continue to follow.    Final Discharge Disposition Code 01 - home or self-care              Continued Care and Services - Admitted Since 7/12/2022     Coordination has not been started for this encounter.       Expected Discharge Date and Time     Expected Discharge Date Expected Discharge Time    Jul 15, 2022          Demographic Summary     Row Name 07/13/22 1036       General Information    Admission Type inpatient    Arrived From home    Referral Source admission list    Reason for Consult discharge planning    Preferred Language English               Functional Status     Row Name 07/13/22 1037       Functional Status    Usual Activity Tolerance fair    Current Activity Tolerance fair       Functional Status, IADL    Medications assistive equipment    Meal Preparation assistive person    Housekeeping completely dependent    Laundry completely dependent    Shopping completely dependent               Psychosocial    No documentation.                Abuse/Neglect    No documentation.                Legal    No documentation.                Substance Abuse    No documentation.                Patient Forms    No documentation.                   Jocelyn Dominguez RN

## 2022-07-14 VITALS
WEIGHT: 315 LBS | DIASTOLIC BLOOD PRESSURE: 76 MMHG | RESPIRATION RATE: 20 BRPM | OXYGEN SATURATION: 95 % | HEIGHT: 75 IN | SYSTOLIC BLOOD PRESSURE: 128 MMHG | BODY MASS INDEX: 39.17 KG/M2 | TEMPERATURE: 96.6 F | HEART RATE: 74 BPM

## 2022-07-14 LAB
GLUCOSE BLDC GLUCOMTR-MCNC: 230 MG/DL (ref 70–130)
GLUCOSE BLDC GLUCOMTR-MCNC: 315 MG/DL (ref 70–130)
MAGNESIUM SERPL-MCNC: 2.3 MG/DL (ref 1.6–2.4)
POTASSIUM SERPL-SCNC: 4.3 MMOL/L (ref 3.5–5.2)

## 2022-07-14 PROCEDURE — 84132 ASSAY OF SERUM POTASSIUM: CPT | Performed by: NURSE PRACTITIONER

## 2022-07-14 PROCEDURE — 29580 STRAPPING UNNA BOOT: CPT

## 2022-07-14 PROCEDURE — 83735 ASSAY OF MAGNESIUM: CPT | Performed by: NURSE PRACTITIONER

## 2022-07-14 PROCEDURE — 93005 ELECTROCARDIOGRAM TRACING: CPT | Performed by: NURSE PRACTITIONER

## 2022-07-14 PROCEDURE — 93010 ELECTROCARDIOGRAM REPORT: CPT | Performed by: INTERNAL MEDICINE

## 2022-07-14 PROCEDURE — 93005 ELECTROCARDIOGRAM TRACING: CPT | Performed by: INTERNAL MEDICINE

## 2022-07-14 PROCEDURE — 82962 GLUCOSE BLOOD TEST: CPT

## 2022-07-14 PROCEDURE — 97162 PT EVAL MOD COMPLEX 30 MIN: CPT

## 2022-07-14 PROCEDURE — 99238 HOSP IP/OBS DSCHRG MGMT 30/<: CPT | Performed by: NURSE PRACTITIONER

## 2022-07-14 PROCEDURE — 63710000001 INSULIN LISPRO (HUMAN) PER 5 UNITS: Performed by: NURSE PRACTITIONER

## 2022-07-14 RX ORDER — DOFETILIDE 0.5 MG/1
500 CAPSULE ORAL EVERY 12 HOURS
Qty: 60 CAPSULE | Refills: 6 | Status: SHIPPED | OUTPATIENT
Start: 2022-07-14 | End: 2023-01-03 | Stop reason: SDUPTHER

## 2022-07-14 RX ADMIN — Medication 1 PATCH: at 08:06

## 2022-07-14 RX ADMIN — DULOXETINE HYDROCHLORIDE 60 MG: 60 CAPSULE, DELAYED RELEASE ORAL at 08:04

## 2022-07-14 RX ADMIN — BUMETANIDE 2 MG: 2 TABLET ORAL at 08:04

## 2022-07-14 RX ADMIN — INSULIN LISPRO 8 UNITS: 100 INJECTION, SOLUTION INTRAVENOUS; SUBCUTANEOUS at 08:37

## 2022-07-14 RX ADMIN — RIVAROXABAN 20 MG: 20 TABLET, FILM COATED ORAL at 17:32

## 2022-07-14 RX ADMIN — SPIRONOLACTONE 25 MG: 25 TABLET ORAL at 08:03

## 2022-07-14 RX ADMIN — SACUBITRIL AND VALSARTAN 1 TABLET: 49; 51 TABLET, FILM COATED ORAL at 08:03

## 2022-07-14 RX ADMIN — DOFETILIDE 500 MCG: 0.5 CAPSULE ORAL at 17:32

## 2022-07-14 RX ADMIN — BUSPIRONE HYDROCHLORIDE 10 MG: 10 TABLET ORAL at 08:04

## 2022-07-14 RX ADMIN — INSULIN LISPRO 16 UNITS: 100 INJECTION, SOLUTION INTRAVENOUS; SUBCUTANEOUS at 13:07

## 2022-07-14 RX ADMIN — DOFETILIDE 500 MCG: 0.5 CAPSULE ORAL at 06:34

## 2022-07-14 RX ADMIN — CARVEDILOL 25 MG: 12.5 TABLET, FILM COATED ORAL at 08:04

## 2022-07-14 RX ADMIN — BUSPIRONE HYDROCHLORIDE 10 MG: 10 TABLET ORAL at 15:34

## 2022-07-14 NOTE — PROGRESS NOTES
"[facility] Electrophysiologty  In Patient progress note   LOS: 2 days   Patient Care Team:  Roberto Fields DO as PCP - General (Family Medicine)  Trung Borrego Jr., MD as Consulting Physician (Interventional Cardiology)    Chief Complaint: Follow up for Atrial Fibrillation    Subjective Denies Chest Pain Denies Dyspnea Eating OK Ambulating.  No side effects to Tikosyn.  Wants to go home.      Tele: Paced      Vitals:  /91 (BP Location: Right arm, Patient Position: Lying)   Pulse 81   Temp 96.6 °F (35.9 °C) (Axillary)   Resp 20   Ht 190.5 cm (75\")   Wt (!) 154 kg (340 lb)   SpO2 90%   BMI 42.50 kg/m²    Body mass index is 42.5 kg/m².  No intake or output data in the 24 hours ending 07/14/22 0925    Physical Exam:      General: alert, no acute distress, acyanotic, well developed, well nourished   Chest: Clear to auscultation bilaterally.  Respiratory effort is normal.   CV: Regular rate rhythm normal S1-S2.  There is an S3 and S4.   Extremities: Benign examination.    Results Review:         Labs:  Results from last 7 days   Lab Units 07/12/22  1623   INR  1.02     Results from last 7 days   Lab Units 07/13/22  0637 07/12/22  1623   SODIUM mmol/L  --  137   POTASSIUM mmol/L 4.6 4.2   CHLORIDE mmol/L  --  101   CO2 mmol/L  --  26.0   BUN mg/dL  --  24*   CREATININE mg/dL  --  1.51*   GLUCOSE mg/dL  --  236*   CALCIUM mg/dL  --  9.4     Estimated Creatinine Clearance: 75.2 mL/min (A) (by C-G formula based on SCr of 1.51 mg/dL (H)).              Scheduled Meds:  atorvastatin, 20 mg, Oral, Nightly  bumetanide, 2 mg, Oral, BID  busPIRone, 10 mg, Oral, TID  carvedilol, 25 mg, Oral, BID  diazePAM, 10 mg, Oral, Nightly  dofetilide, 500 mcg, Oral, Q12H  DULoxetine, 60 mg, Oral, Daily  insulin lispro, 0-24 Units, Subcutaneous, TID AC  nicotine, 1 patch, Transdermal, Q24H  Pharmacy Meds to Bed Consult, , Does not apply, Daily  rivaroxaban, 20 mg, Oral, Daily With Dinner  sacubitril-valsartan, 1 " tablet, Oral, BID  spironolactone, 25 mg, Oral, BID      Continuous Infusions:       Assessment: Plan:          1.  Atrial Fibrillation   a. Had his 4th dose of Tikosyn this morning.  QTC remained stable.  No side effects of the medication.  On 500 mics p.o. twice daily.    2. NICM/CHF  a. EF 30%  b. BiV-ICD.  Follow-up per cardiologist on appropriate medications.    3. HTN  -well managed on current Rx      Electronically signed by RAISA Clayton, 07/14/22, 10:19 AM EDT.    Home after 3 PM today if repeat EKG stable.    I, Freddy Burgess MD, personally performed the services face to face as described and documented by the above named individual. I have made any necessary edits and it is both accurate and complete 7/14/2022  14:46 EDT

## 2022-07-14 NOTE — THERAPY WOUND CARE TREATMENT
Acute Care - Wound/Debridement Initial Evaluation  [facility]     Patient Name: Shalom Jarvis  : 1955  MRN: [id number]  Today's Date: 2022                Admit Date: 2022    Visit Dx:  No diagnosis found.    Patient Active Problem List   Diagnosis   • Afib (HCC)        Past Medical History:   Diagnosis Date   • Atrial fibrillation (HCC)    • Chicken pox    • Diabetes (HCC)    • Heart failure (HCC)    • Hypertension    • Measles         Past Surgical History:   Procedure Laterality Date   • ABLATION OF DYSRHYTHMIC FOCUS     • CARDIOVERSION     • KNEE SURGERY      acl   • PACEMAKER IMPLANTATION             Wound 22 1445 Left lower calf Venous Ulcer (Active)   Dressing Appearance moist drainage;intact 22 0900   Closure Other (Comment) 22 0800   Base moist;red 22 0900   Periwound swelling;warm 22 0900   Periwound Temperature warm 22 0800   Periwound Skin Turgor firm 22 0800   Drainage Characteristics/Odor serous 22 09   Drainage Amount large 22 0900   Care, Wound cleansed with;wound cleanser;taran boot 22 0900   Dressing Care other (see comments) 22 0800   Periwound Care dry periwound area maintained 22 0800       Wound 22 1445 Left palm Abrasion (Active)   Dressing Appearance open to air 22 0800   Closure Open to air 22 0800   Base dry;scab;red 22 0800   Periwound dry;intact;pink 22 0800   Periwound Temperature warm 22 0800   Periwound Skin Turgor firm 22 0800   Edges rolled/closed 22 0800   Dressing Care open to air 22 0800   Periwound Care dry periwound area maintained 22 0800       Wound 22 1445 Bilateral lower arm Abrasion (Active)   Dressing Appearance dry;intact 22 0800   Closure Adhesive bandage 22 0800   Base dressing in place, unable to visualize 22 0800   Periwound dry;intact;pink 22 0800   Periwound Temperature warm 22  0800   Periwound Skin Turgor soft 07/14/22 0800   Drainage Amount none 07/14/22 0800   Care, Wound cleansed with;sterile normal saline 07/13/22 2000   Dressing Care dressing applied 07/13/22 2000   Periwound Care dry periwound area maintained 07/14/22 0800      Lymphedema     Row Name 07/14/22 0900             Lymphedema Edema Assessment    Ptting Edema Category By severity  -KW      Pitting Edema Moderate;Severe  -KW              Skin Changes/Observations    Location/Assessment Lower Extremity  -KW      Lower Extremity Conditions right:;intact;clean;dry;left:;weeping;inflamed;fragile;hairless  -KW      Lower Extremity Color/Pigment left:;red;erythema;right:;normal  -KW              Lymphedema Pulses/Capillary Refill    Lymphedema Pulses/Capillary Refill capillary refill  -KW      Capillary Refill lower extremity capillary refill  -KW      Lower Extremity Capillary Refill right:;left:;less than 3 seconds  -KW              Compression/Skin Care    Compression/Skin Care skin care;wrapping location  -KW      Skin Care washed/dried  -KW      Wrapping Location lower extremity  -KW      Wrapping Location LE left:;foot to knee  -KW      Wrapping Comments unna boot in clamshell pattern, covered with kerlix and abd pad to posterior calf and coban applied at 50% stretch and overlap. secured with spandage.  -KW            User Key  (r) = Recorded By, (t) = Taken By, (c) = Cosigned By    Initials Name Provider Type    KW Mitzi Wei, PT Physical Therapist                WOUND DEBRIDEMENT  Total area of Debridement: 15cm  Debridement Site 1  Location- Site 1: L LE  Selective Debridement- Site 1: Wound Surface <20cmsq  Instruments- Site 1: tweezers  Excised Tissue Description- Site 1: minimum, other (comment) (dry nonviable tissue)  Bleeding- Site 1: none               PT Assessment (last 12 hours)     PT Evaluation and Treatment     Row Name 07/14/22 0900          Physical Therapy Time and Intention    Subjective  Information complains of;swelling  -KW     Document Type evaluation;wound care  -KW     Mode of Treatment physical therapy;individual therapy  -KW     Row Name 07/14/22 0900          General Information    Patient Profile Reviewed yes  -KW     Row Name 07/14/22 0900          Pain    Pretreatment Pain Rating 0/10 - no pain  -KW     Row Name 07/14/22 0900          Wound 07/12/22 1445 Left lower calf Venous Ulcer    Wound - Properties Group Placement Date: 07/12/22  -KWA Placement Time: 1445  -KWA Side: Left  -KWA Orientation: lower  -KWA Location: calf  -KWA Primary Wound Type: Venous ulcer  -KWA     Dressing Appearance moist drainage;intact  -KW     Base moist;red  -KW     Periwound swelling;warm  -KW     Drainage Characteristics/Odor serous  -KW     Drainage Amount large  -KW     Care, Wound cleansed with;wound cleanser;taran boot  -KW     Retired Wound - Properties Group Placement Date: 07/12/22  -KWA Placement Time: 1445  -KWA Side: Left  -KWA Orientation: lower  -KWA Location: calf  -KWA Primary Wound Type: Venous ulcer  -KWA     Retired Wound - Properties Group Date first assessed: 07/12/22  -KWA Time first assessed: 1445  -KWA Side: Left  -KWA Location: calf  -KWA Primary Wound Type: Venous ulcer  -KWA     Row Name             Wound 07/12/22 1445 Left palm Abrasion    Wound - Properties Group Placement Date: 07/12/22  -KWA Placement Time: 1445  -KWA Present on Hospital Admission: Y  -KWA Side: Left  -KWA Location: palm  -KWA Primary Wound Type: Abrasion  -KWA     Retired Wound - Properties Group Placement Date: 07/12/22  -KWA Placement Time: 1445  -KWA Present on Hospital Admission: Y  -KWA Side: Left  -KWA Location: palm  -KWA Primary Wound Type: Abrasion  -KWA     Retired Wound - Properties Group Date first assessed: 07/12/22  -KWA Time first assessed: 1445  -KWA Present on Hospital Admission: Y  -KWA Side: Left  -KWA Location: palm  -KWA Primary Wound Type: Abrasion  -KWA     Row Name             Wound  07/12/22 1445 Bilateral lower arm Abrasion    Wound - Properties Group Placement Date: 07/12/22  -KWA Placement Time: 1445  -KWA Present on Hospital Admission: Y  -KWA Side: Bilateral  -KWA Orientation: lower  -KWA Location: arm  -KWA Primary Wound Type: Abrasion  -KWA     Retired Wound - Properties Group Placement Date: 07/12/22  -KWA Placement Time: 1445  -KWA Present on Hospital Admission: Y  -KWA Side: Bilateral  -KWA Orientation: lower  -KWA Location: arm  -KWA Primary Wound Type: Abrasion  -KWA     Retired Wound - Properties Group Date first assessed: 07/12/22  -KWA Time first assessed: 1445  -KWA Present on Hospital Admission: Y  -KWA Side: Bilateral  -KWA Location: arm  -KWA Primary Wound Type: Abrasion  -KWA     Row Name 07/14/22 0900          Coping    Observed Emotional State calm;cooperative;pleasant  -KW     Verbalized Emotional State acceptance  -KW     Row Name 07/14/22 0900          Plan of Care Review    Plan of Care Reviewed With patient  -KW     Progress no change  -KW     Outcome Evaluation PT wound eval complete. Patient with moderate-severe swelling in LE with L LE ulcer. Patient resistant to unna boots however after extended time spent on education he was agreeable to try unna boots. Patient would continue to benefit from unna boots to manage edema and improve skin integrity. R LE with mild edema however patient declined MLW on R LE.  -KW     Row Name 07/14/22 0900          Positioning and Restraints    Pre-Treatment Position sitting in chair/recliner  -KW     Post Treatment Position chair  -KW     In Chair reclined;call light within reach;encouraged to call for assist;with family/caregiver  -KW     Row Name 07/14/22 0900          Physical Therapy Goals    Wound Care Goal Selection (PT) wound care, PT goal 1;wound care, PT goal 2  -KW     Row Name 07/14/22 0900          Wound Care Goal 1 (PT)    Wound Care Goal 1 (PT) Patient's L LE ulcer will be closed with normalized color and texture.  -KW      Time Frame (Wound Care Goal 1, PT) 10 days  -KW           User Key  (r) = Recorded By, (t) = Taken By, (c) = Cosigned By    Initials Name Provider Type    Mitzi Victoria PT Physical Therapist    Christine Del Angel, RN Registered Nurse              Physical Therapy Education                 Title: PT OT SLP Therapies (Not Started)     Topic: Physical Therapy (Not Started)     Point: Mobility training (Not Started)     Learner Progress:  Not documented in this visit.          Point: Home exercise program (Not Started)     Learner Progress:  Not documented in this visit.          Point: Body mechanics (Not Started)     Learner Progress:  Not documented in this visit.          Point: Precautions (Not Started)     Learner Progress:  Not documented in this visit.                            Recommendation and Plan     Plan of Care Reviewed With: patient   Progress: no change       Progress: no change  Outcome Evaluation: PT wound eval complete. Patient with moderate-severe swelling in LE with L LE ulcer. Patient resistant to unna boots however after extended time spent on education he was agreeable to try unna boots. Patient would continue to benefit from unna boots to manage edema and improve skin integrity. R LE with mild edema however patient declined MLW on R LE.  Plan of Care Reviewed With: patient            Time Calculation   PT Charges     Row Name 07/14/22 0900             Time Calculation    Start Time 1543  -KW              Untimed Charges    PT Eval/Re-eval Minutes 40  -KW      29580-Unna Boot 20  -KW              Total Minutes    Untimed Charges Total Minutes 60  -KW       Total Minutes 60  -KW            User Key  (r) = Recorded By, (t) = Taken By, (c) = Cosigned By    Initials Name Provider Type    Mitzi Victoria PT Physical Therapist                  Therapy Charges for Today     Code Description Service Date Service Provider Modifiers Qty    95931796811  PT EVAL MOD COMPLEXITY 3  7/14/2022 Mitzi Wei, PT GP 1    [id number]  PT STAPPING UNNA BOOT 7/14/2022 Mitzi Wei, PT GP 1            PT G-Codes  AM-PAC 6 Clicks Score (PT): 24       Mitzi Wei, PT  7/14/2022

## 2022-07-14 NOTE — PLAN OF CARE
Goal Outcome Evaluation:  Plan of Care Reviewed With: patient        Progress: no change  Outcome Evaluation: PT wound eval complete. Patient with moderate-severe swelling in LE with L LE ulcer. Patient resistant to unna boots however after extended time spent on education he was agreeable to try unna boots. Patient would continue to benefit from unna boots to manage edema and improve skin integrity. R LE with mild edema however patient declined MLW on R LE.

## 2022-07-15 ENCOUNTER — PREP FOR SURGERY (OUTPATIENT)
Dept: OTHER | Facility: HOSPITAL | Age: 67
End: 2022-07-15

## 2022-07-15 DIAGNOSIS — I48.0 PAROXYSMAL ATRIAL FIBRILLATION: Primary | ICD-10-CM

## 2022-07-15 LAB
QT INTERVAL: 482 MS
QT INTERVAL: 496 MS
QT INTERVAL: 524 MS
QT INTERVAL: 532 MS
QTC INTERVAL: 568 MS
QTC INTERVAL: 580 MS
QTC INTERVAL: 600 MS
QTC INTERVAL: 625 MS

## 2022-07-15 RX ORDER — POTASSIUM CHLORIDE 750 MG/1
40 CAPSULE, EXTENDED RELEASE ORAL AS NEEDED
Status: CANCELLED | OUTPATIENT
Start: 2022-07-15

## 2022-07-15 RX ORDER — MAGNESIUM SULFATE HEPTAHYDRATE 40 MG/ML
2 INJECTION, SOLUTION INTRAVENOUS AS NEEDED
Status: CANCELLED | OUTPATIENT
Start: 2022-07-15

## 2022-07-15 RX ORDER — POTASSIUM CHLORIDE 1.5 G/1.77G
40 POWDER, FOR SOLUTION ORAL AS NEEDED
Status: CANCELLED | OUTPATIENT
Start: 2022-07-15

## 2022-07-15 RX ORDER — MAGNESIUM SULFATE HEPTAHYDRATE 40 MG/ML
4 INJECTION, SOLUTION INTRAVENOUS AS NEEDED
Status: CANCELLED | OUTPATIENT
Start: 2022-07-15

## 2022-07-28 NOTE — DISCHARGE SUMMARY
[facility] Cardiology Services  DISCHARGE SUMMARY    Date of Discharge:  7/28/2022    Discharge Diagnosis: AF    Presenting Problem/History of Present Illness  Afib (HCC) [I48.91]    PROBLEM LIST:  1. Atrial Fibrillation   a. CHADSVASC: 4 (age, CHF, HTN, DM), on Xarelto  b. 11/23/21 ECV: unsuccessful with subsequent Amiodarone initiation   c. 3/16/22 ECV successful while on Amiodarone   2. NICM/CHF  a. LHC 4/6/2018: Ventriculography 25-30%, No obstructive CAD   b. 2018- Implant of [facility] - ICD   c. Echo 5/3/21 LVEF 20-25%, severe global hypokinesis of the LV, LV chamber severely dilated.   d. 8/24/21 LVEF 30-35%, mod LA dilation, Mild MR and TR  e. 12/9/2021 upgrade to BIV ICD per Dr. Borrego   3. History of Conduction system disease   4. HTN  5. HLD  6. DM  7. COPD with home 2L 02 use at night   8. Chronic Back Pain   9. Surgical History   a. Right total knee replacement.      Hospital Course    Shalom Jarvis is a 66 y.o. male with the above noted pmhx who presented for Tikosyn initiation, 7/12/22 for his persistent AF. He has a known history of atrial fibrillation. He was treated with amiodarone from November to April 2022 at which time patient was given the option to discontinue amiodarone in favor of using Tikosyn versus PVA. Patient opted to proceed with Tikosyn initiation. The patient was admitted to the telemetry floor for careful monitoring. On admission, He was in NSR and maintained NSR during his stay. The patient's  labs were reviewed, pharmacy was consulted and the patient's dose was calculated. Tikosyn was initiated at 500 mcg BID and the QTc interval was followed closely. During admission the patient and family received education about Tikosyn and potential medication interactions and QT prolonging meds. With regards to anticoagulation pt has done well continuing on Xarelto. At the time of discharge the patient is stable and appropriate for discharge to home.     Procedures  Performed         Consults:   Consults     No orders found from 6/13/2022 to 7/13/2022.          Pertinent Test Results:     Lab Results   Component Value Date    GLUCOSE 236 (H) 07/12/2022    CALCIUM 9.4 07/12/2022     07/12/2022    K 4.3 07/14/2022    CO2 26.0 07/12/2022     07/12/2022    BUN 24 (H) 07/12/2022    CREATININE 1.51 (H) 07/12/2022    BCR 15.9 07/12/2022    ANIONGAP 10.0 07/12/2022       Lab Results   Component Value Date    GLUCOSE 236 (H) 07/12/2022    BUN 24 (H) 07/12/2022    CREATININE 1.51 (H) 07/12/2022    BCR 15.9 07/12/2022    K 4.3 07/14/2022    CO2 26.0 07/12/2022    CALCIUM 9.4 07/12/2022         Condition on Discharge:  Stable    Discharge Disposition: Home    Discharge Diet: Cardiac heart healthy diet    Activity at Discharge: As tolerated    Follow-up Appointments  Future Appointments   Date Time Provider Department Center   3/3/2023 10:15 AM Freddy Burgess MD [facility]     Additional Instructions for the Follow-ups that You Need to Schedule     Discharge Follow-up with Specified Provider: Aditya; 3 Months   As directed      To: Aditya    Follow Up: 3 Months    Follow Up Details: [facility] CPI ICD               Discharge Medications     Discharge Medications      New Medications      Instructions Start Date   dofetilide 500 MCG capsule  Commonly known as: Tikosyn   500 mcg, Oral, Every 12 Hours         Continue These Medications      Instructions Start Date   atorvastatin 20 MG tablet  Commonly known as: LIPITOR   20 mg, Oral, Daily      bumetanide 2 MG tablet  Commonly known as: BUMEX   2 mg, Oral, 2 Times Daily      busPIRone 10 MG tablet  Commonly known as: BUSPAR   10 mg, Oral, 3 Times Daily      carvedilol 25 MG tablet  Commonly known as: COREG  Notes to patient: Hold one dose if heart rate is less than 60 or top number of blood pressure is less than 90.   25 mg, Oral, 2 Times Daily      diazePAM 10 MG tablet  Commonly known as: VALIUM   Nightly  PRN      DULoxetine 60 MG capsule  Commonly known as: CYMBALTA   60 mg, Oral, Daily      Entresto 49-51 MG tablet  Generic drug: sacubitril-valsartan   1 tablet, Oral, 2 Times Daily      fenofibrate 145 MG tablet  Commonly known as: TRICOR   145 mg, Oral, Every Night at Bedtime      metFORMIN 1000 MG tablet  Commonly known as: GLUCOPHAGE   1,000 mg, Oral, 2 Times Daily      multivitamin tablet tablet   1 tablet, Oral, Daily      NovoLOG FlexPen 100 UNIT/ML solution pen-injector sc pen  Generic drug: insulin aspart   3 Times Daily      rivaroxaban 20 MG tablet  Commonly known as: XARELTO   20 mg, Oral, Daily      spironolactone 25 MG tablet  Commonly known as: ALDACTONE   25 mg, Oral, 2 Times Daily      Tresiba FlexTouch 200 UNIT/ML solution pen-injector pen injection  Generic drug: Insulin Degludec   50 Units, Every Night at Bedtime              Electronically signed by NAN Eaton, 07/28/22, 9:12 AM EDT.        Symptoms

## 2023-01-03 RX ORDER — DOFETILIDE 0.5 MG/1
500 CAPSULE ORAL EVERY 12 HOURS
Qty: 60 CAPSULE | Refills: 5 | Status: SHIPPED | OUTPATIENT
Start: 2023-01-03

## 2023-03-03 ENCOUNTER — OFFICE VISIT (OUTPATIENT)
Dept: CARDIOLOGY | Facility: CLINIC | Age: 68
End: 2023-03-03
Payer: MEDICARE

## 2023-03-03 VITALS
BODY MASS INDEX: 39.17 KG/M2 | HEART RATE: 92 BPM | WEIGHT: 315 LBS | SYSTOLIC BLOOD PRESSURE: 110 MMHG | HEIGHT: 75 IN | DIASTOLIC BLOOD PRESSURE: 60 MMHG | OXYGEN SATURATION: 96 %

## 2023-03-03 DIAGNOSIS — T82.110A FAILURE OF PACEMAKER LEAD, INITIAL ENCOUNTER: ICD-10-CM

## 2023-03-03 DIAGNOSIS — I10 ESSENTIAL HYPERTENSION: ICD-10-CM

## 2023-03-03 DIAGNOSIS — I42.0 CARDIOMYOPATHY, DILATED: ICD-10-CM

## 2023-03-03 DIAGNOSIS — I50.22 CHRONIC SYSTOLIC CONGESTIVE HEART FAILURE: ICD-10-CM

## 2023-03-03 DIAGNOSIS — I48.0 PAROXYSMAL ATRIAL FIBRILLATION: Primary | ICD-10-CM

## 2023-03-03 PROCEDURE — 93284 PRGRMG EVAL IMPLANTABLE DFB: CPT | Performed by: INTERNAL MEDICINE

## 2023-03-03 PROCEDURE — 93000 ELECTROCARDIOGRAM COMPLETE: CPT | Performed by: INTERNAL MEDICINE

## 2023-03-03 PROCEDURE — 99214 OFFICE O/P EST MOD 30 MIN: CPT | Performed by: INTERNAL MEDICINE

## 2023-03-03 NOTE — PROGRESS NOTES
Shalom NGUYEN Matheus  1955  [phone]    03/03/2023    [facility] CARDIOLOGY [place]     Referring Provider: No ref. provider found     Roberto Fields  [facility] [id number]    Chief Complaint   Patient presents with   • Atrial fibrillation, persistent (HCC)       Problem List:   1. Atrial Fibrillation   a. CHADSVASC: 4 (age, CHF, HTN, DM), on Xarelto  b. 11/23/21 ECV: unsuccessful with subsequent Amiodarone initiation   c. 3/16/22 ECV successful while on Amiodarone   d. 7/12/22 Tikosyn admision  2. NICM/CHF  a. C 4/6/2018: Ventriculography 25-30%, No obstructive CAD   b. 2018- Implant of [facility] [place]DD - ICD   c. Echo 5/3/21 LVEF 20-25%, severe global hypokinesis of the LV, LV chamber severely dilated.   d. 8/24/21 LVEF 30-35%, mod LA dilation, Mild MR and TR  e. 12/9/2021 upgrade to BIV ICD per Dr. Borrego   3. History of Conduction system disease   4. HTN  5. HLD  6. DM  7. COPD with home 2L 02 use at night   8. Chronic Back Pain   9. Chonic renal insufficiency with baseline creatinine 1.5.  10. Surgical History   a. Right total knee replacement.   Allergies  Allergies   Allergen Reactions   • Dulaglutide Other (See Comments)   • Shellfish Allergy Unknown (See Comments)       Current Medications    Current Outpatient Medications:   •  atorvastatin (LIPITOR) 20 MG tablet, Take 1 tablet by mouth Daily., Disp: , Rfl:   •  bumetanide (BUMEX) 2 MG tablet, Take 1 tablet by mouth 2 (Two) Times a Day., Disp: , Rfl:   •  busPIRone (BUSPAR) 10 MG tablet, Take 1 tablet by mouth 3 (Three) Times a Day., Disp: , Rfl:   •  carvedilol (COREG) 25 MG tablet, Take 1 tablet by mouth 2 (Two) Times a Day., Disp: , Rfl:   •  diazePAM (VALIUM) 10 MG tablet, At Night As Needed., Disp: , Rfl:   •  dofetilide (Tikosyn) 500 MCG capsule, Take 1 capsule by mouth Every 12 (Twelve) Hours., Disp: 60 capsule, Rfl: 5  •  DULoxetine (CYMBALTA) 60 MG capsule, Take 1 capsule by mouth Daily., Disp: , Rfl:   •   "Entresto 49-51 MG tablet, Take 1 tablet by mouth 2 (Two) Times a Day., Disp: , Rfl:   •  fenofibrate (TRICOR) 145 MG tablet, Take 1 tablet by mouth every night at bedtime., Disp: , Rfl:   •  insulin aspart (NovoLOG FlexPen) 100 UNIT/ML solution pen-injector sc pen, 3 (Three) Times a Day., Disp: , Rfl:   •  Insulin Degludec (Tresiba FlexTouch) 200 UNIT/ML solution pen-injector pen injection, 50 Units every night at bedtime., Disp: , Rfl:   •  metFORMIN (GLUCOPHAGE) 1000 MG tablet, Take 1 tablet by mouth 2 (Two) Times a Day., Disp: , Rfl:   •  multivitamin (THERAGRAN) tablet tablet, Take 1 tablet by mouth Daily., Disp: , Rfl:   •  rivaroxaban (XARELTO) 20 MG tablet, Take 1 tablet by mouth Daily., Disp: , Rfl:   •  spironolactone (ALDACTONE) 25 MG tablet, Take 1 tablet by mouth 2 (Two) Times a Day., Disp: , Rfl:     History of Present Illness     Pt presents for follow up of AF/HTN. Since we last saw the pt, pt denies any AF episodes, CP, LH, and dizziness. Denies any hospitalizations, ER visits, bleeding, or TIA/CVA symptoms. Overall feels ok.  He does note more fatigue since he was last seen us and more shortness of breath with exertion.  He does have lower extremity edema at times.     ROS:  General:  + fatigue, No weight gain or loss  Cardiovascular:  Denies CP, PND, syncope, near syncope, + edema or palpitations.  Pulmonary:  + KOVACS, No cough, or wheezing      Vitals:    03/03/23 1005   BP: 110/60   BP Location: Right arm   Patient Position: Sitting   Pulse: 92   SpO2: 96%   Weight: (!) 150 kg (331 lb)   Height: 190.5 cm (75\")     Body mass index is 41.37 kg/m².  PE:  General: NAD  Neck: no JVD, no carotid bruits, no TM  Heart RRR, NL S1, S2, S3, S4 present, no rubs, murmurs  Lungs: CTA, no wheezes, rhonchi, or rales  Abd: soft, non-tender, NL BS, obese  Ext: No musculoskeletal deformities, TR edema, no cyanosis, or clubbing  Psych: normal mood and affect    Diagnostic Data:  Manual Device Interrogation: MARYC " BiV-ICD, DDDR @ 70, RAp 37%,  BiV paced 99%, Vo VT or AF, 2 years on battery.  LV threshold today at 6.5 V at 1 ms apparently increased from 4 V at 1 ms.  Patient was not capturing this morning prior to adjustments on his ICD.  Device was reprogrammed accordingly.  LV lead programmed to 7-1/2 V at 1 ms and lower rate was dropped to 60 bpm.      ECG 12 Lead    Date/Time: 3/3/2023 10:37 AM  Performed by: Freddy Burgess MD  Authorized by: Freddy Burgess MD   Comparison: compared with previous ECG from 4/6/2022  Similar to previous ECG  Rhythm: sinus rhythm and paced  BPM: 80              Advance Care Planning   ACP discussion was declined by the patient. Patient does not have an advance directive, declines further assistance.       1. Paroxysmal atrial fibrillation (HCC)    2. Failure of pacemaker lead, initial encounter    3. Chronic systolic congestive heart failure (HCC)    4. Cardiomyopathy, dilated (HCC)    5. Essential hypertension          Plan:  1. Persistent Atrial Fibrillation on tikosyn: QT stable. No significant AF on PM check doing well from an arrhythmia standpoint.  -CHADSVASC: 4 on xarelto.        2.  Severely elevated LV threshold.  Device reprogrammed accordingly.  Will most likely need revision of his LV pacing system.  Would like for the patient to undergo an echocardiogram with Dr. Borrego next week to assess LVEF.  We will arrange revision of his system as an outpatient.  Of note, patient's LV threshold implant was 4 V at 1 ms.    3.  NICM/ Chronic Systolic Heart Failure/Class II-III CHF  -8/24/21 LVEF 30-35%, mod LA dilation, Mild MR and TR  -Upgrade to BiV ICD in December 2021, no shocks.   -On Guideline Directed Medical including Coreg, Entresto, Bumex, Aldactone  Per Dr. Isaiah Bruno.     4. HTN: stable on meds    F/up in 3 months

## 2023-05-19 DIAGNOSIS — I42.0 CARDIOMYOPATHY, DILATED: Primary | ICD-10-CM

## 2023-05-19 DIAGNOSIS — T82.110A AICD LEAD MALFUNCTION: ICD-10-CM

## 2023-05-24 PROBLEM — T82.110A AICD LEAD MALFUNCTION: Status: ACTIVE | Noted: 2023-05-24

## 2023-06-13 RX ORDER — DOFETILIDE 0.5 MG/1
CAPSULE ORAL
Qty: 180 CAPSULE | Refills: 2 | Status: SHIPPED | OUTPATIENT
Start: 2023-06-13

## 2023-09-28 ENCOUNTER — OFFICE VISIT (OUTPATIENT)
Dept: CARDIOLOGY | Facility: CLINIC | Age: 68
End: 2023-09-28
Payer: MEDICARE

## 2023-09-28 VITALS
HEIGHT: 74 IN | SYSTOLIC BLOOD PRESSURE: 114 MMHG | OXYGEN SATURATION: 95 % | HEART RATE: 57 BPM | WEIGHT: 315 LBS | DIASTOLIC BLOOD PRESSURE: 64 MMHG | BODY MASS INDEX: 40.43 KG/M2

## 2023-09-28 DIAGNOSIS — I48.0 PAROXYSMAL ATRIAL FIBRILLATION: Primary | ICD-10-CM

## 2023-09-28 DIAGNOSIS — I42.0 CARDIOMYOPATHY, DILATED: ICD-10-CM

## 2023-09-28 DIAGNOSIS — I10 ESSENTIAL HYPERTENSION: ICD-10-CM

## 2023-09-28 RX ORDER — FLOMAX 0.4 MG/1
1 CAPSULE ORAL DAILY
COMMUNITY
Start: 2023-08-30 | End: 2024-08-24

## 2023-09-28 RX ORDER — SACUBITRIL AND VALSARTAN 24; 26 MG/1; MG/1
1 TABLET, FILM COATED ORAL 2 TIMES DAILY
Qty: 60 TABLET | Refills: 6 | Status: SHIPPED | OUTPATIENT
Start: 2023-09-28

## 2023-09-28 RX ORDER — INSULIN ASPART 100 [IU]/ML
INJECTION, SUSPENSION SUBCUTANEOUS
COMMUNITY

## 2023-09-28 NOTE — PROGRESS NOTES
"Shalom Jarvis  1955  Home phone not available        [facility] CARDIOLOGY         Junito, Tiarra Frost MD  [address] [place] [id number]    Chief Complaint   Patient presents with    Atrial Fibrillation       Problem List:   Atrial Fibrillation   CHADSVASC: 4 (age, CHF, HTN, DM), on Xarelto  11/23/21 ECV: unsuccessful with subsequent Amiodarone initiation   3/16/22 ECV successful while on Amiodarone   7/12/22 Tikosyn admision  NICM/CHF  [facility] 4/6/2018: Ventriculography 25-30%, No obstructive CAD   2018- Implant of [facility] DDD - ICD   Echo 5/3/21 LVEF 20-25%, severe global hypokinesis of the LV, LV chamber severely dilated.   8/24/21 LVEF 30-35%, mod LA dilation, Mild MR and TR  12/9/2021 upgrade to BIV ICD per Dr. Borrego   History of Conduction system disease   HTN  HLD  DM  COPD with home 2L 02 use at night   Chronic Back Pain   Chonic renal insufficiency with baseline creatinine 1.5.  Surgical History   Right total knee replacement.   Allergies  Allergies   Allergen Reactions    Dulaglutide Other (See Comments)     \"Affected the electrical system of my heart\"    Shellfish Allergy Anaphylaxis       Current Medications    Current Outpatient Medications:     atorvastatin (LIPITOR) 20 MG tablet, Take 1 tablet by mouth Daily., Disp: , Rfl:     bumetanide (BUMEX) 2 MG tablet, Take 1 tablet by mouth 2 (Two) Times a Day., Disp: , Rfl:     busPIRone (BUSPAR) 10 MG tablet, Take 1 tablet by mouth 3 (Three) Times a Day., Disp: , Rfl:     carvedilol (COREG) 25 MG tablet, Take 1 tablet by mouth 2 (Two) Times a Day., Disp: , Rfl:     diazePAM (VALIUM) 10 MG tablet, Take 1 tablet by mouth At Night As Needed., Disp: , Rfl:     dofetilide (TIKOSYN) 500 MCG capsule, TAKE 1 CAPSULE BY MOUTH EVERY 12 HOURS., Disp: 180 capsule, Rfl: 2    DULoxetine (CYMBALTA) 60 MG capsule, Take 1 capsule by mouth Daily., Disp: , Rfl:     Entresto 49-51 MG tablet, Take 1 tablet by mouth 2 (Two) Times a Day., Disp: , Rfl: "     fenofibrate (TRICOR) 145 MG tablet, Take 1 tablet by mouth every night at bedtime., Disp: , Rfl:     Flomax 0.4 MG capsule 24 hr capsule, Take 1 capsule by mouth Daily., Disp: , Rfl:     insulin aspart (NovoLOG FlexPen) 100 UNIT/ML solution pen-injector sc pen, 3 (Three) Times a Day. 2 units for every 50 points over 150. TID., Disp: , Rfl:     insulin aspart prot & aspart (NovoLOG Mix 70/30 FlexPen) (70-30) 100 UNIT/ML suspension pen-injector injection, Inject  under the skin into the appropriate area as directed 2 (Two) Times a Day Before Meals., Disp: , Rfl:     Insulin Degludec (Tresiba FlexTouch) 200 UNIT/ML solution pen-injector pen injection, 55 Units every night at bedtime., Disp: , Rfl:     metFORMIN (GLUCOPHAGE) 1000 MG tablet, Take 0.5 tablets by mouth 2 (Two) Times a Day With Meals., Disp: , Rfl:     rivaroxaban (XARELTO) 20 MG tablet, Take 1 tablet by mouth Daily., Disp: , Rfl: 0    spironolactone (ALDACTONE) 25 MG tablet, Take 1 tablet by mouth 2 (Two) Times a Day., Disp: , Rfl:     History of Present Illness     Pt presents for follow up of AF/HTN.  The patient is seen after recent BIVICD generator change with a new  coronary sinus pacing lead placement on 6/30/2023.  Device site healed well.  Patient follows with Dr. Borrego.. Since we last saw the pt, pt denies any symptomatic AF episodes, SOB, or chest pain.  However he has been dizzy and lightheaded some.  His wife states she has been taking his blood pressures been as low as in the 90s systolic at times.  Today is a high blood pressure for him.  He wonders if some of his medications make his blood pressure too low and he feels quite poorly with this.      ROS:  General:  + fatigue, No weight gain or loss  Cardiovascular:  Denies CP, PND, syncope, near syncope, + edema or palpitations.  Pulmonary:  + KOVACS, No cough, or wheezing      Vitals:    09/28/23 1115   BP: 114/64   BP Location: Left arm   Patient Position: Sitting   Cuff Size: Large Adult  "  Pulse: 57   SpO2: 95%   Weight: (!) 150 kg (330 lb)   Height: 188 cm (74\")       Body mass index is 42.37 kg/m².  PE:  General: NAD  Neck: no JVD, no carotid bruits, no TM  Heart RRR, NL S1, S2, S3, S4 present, no rubs, murmurs  Lungs: CTA, no wheezes, rhonchi, or rales  Abd: soft, non-tender, NL BS, obese  Ext: No musculoskeletal deformities, TR edema, no cyanosis, or clubbing  Psych: normal mood and affect    Diagnostic Data:  Manual Device Interrogation: [employer] bivicd.  DDD.  A paced 14%.  BiV paced 100%.  Normal thresholds impedance.  Charge impedance 81 ohms.  Battery voltage is 9 years.  Less than 1% mode switch.  LV output decreased to 3.0 at 0.4 ms.  29 mode switches less than 1% episodes.        ECG 12 Lead    Date/Time: 9/28/2023 1:29 PM  Performed by: Evelio Matthews PA  Authorized by: Evelio Matthews PA   Comparison: compared with previous ECG from 3/3/2023  Similar to previous ECG  Rhythm: sinus rhythm  QRS axis: normal    Clinical impression: non-specific ECG        Advance Care Planning   ACP discussion was declined by the patient. Patient does not have an advance directive, declines further assistance.       1. Paroxysmal atrial fibrillation    2. Cardiomyopathy, dilated    3. Essential hypertension          Plan:  1. Persistent Atrial Fibrillation on tikosyn: QT stable. No significant AF on PM check doing well from an arrhythmia standpoint.  -CHADSVASC: 4 on xarelto.        2.  BIVICD: Recent generator change and new coronary sinus pacing lead with extraction of all coronary sinus lead Dr. Burgess June 2023.  Interrogation today stable.    3.  NICM/ Chronic Systolic Heart Failure/Class II-III CHF  -8/24/21 LVEF 30-35%, mod LA dilation, Mild MR and TR  -Upgrade to BiV ICD in December 2021, no shocks.  Recent LV upgrade due to lead malfunction Dr. Burgess June 2023  -On Guideline Directed Medical including Coreg, Entresto, Bumex, Aldactone  Per Dr. Isaiah Bruno.     4. HTN: " Blood pressure has been low and he actually feels poorly with this.  We will reduce his Entresto to the lower 24-26 dose.    Electronically signed by RAISA Amanda, 09/28/23, 1:30 PM EDT.     Electronically signed by RAISA Amanda, 09/28/23, 11:02 AM EDT.

## 2024-02-14 RX ORDER — DOFETILIDE 0.5 MG/1
CAPSULE ORAL
Qty: 180 CAPSULE | Refills: 2 | Status: SHIPPED | OUTPATIENT
Start: 2024-02-14

## 2024-03-01 ENCOUNTER — OFFICE VISIT (OUTPATIENT)
Dept: CARDIOLOGY | Facility: CLINIC | Age: 69
End: 2024-03-01
Payer: MEDICARE

## 2024-03-01 VITALS
WEIGHT: 315 LBS | SYSTOLIC BLOOD PRESSURE: 114 MMHG | DIASTOLIC BLOOD PRESSURE: 66 MMHG | OXYGEN SATURATION: 96 % | HEART RATE: 86 BPM | BODY MASS INDEX: 39.17 KG/M2 | HEIGHT: 75 IN

## 2024-03-01 DIAGNOSIS — I10 PRIMARY HYPERTENSION: ICD-10-CM

## 2024-03-01 DIAGNOSIS — I50.22 CHRONIC SYSTOLIC CONGESTIVE HEART FAILURE: ICD-10-CM

## 2024-03-01 DIAGNOSIS — I42.0 CARDIOMYOPATHY, DILATED: ICD-10-CM

## 2024-03-01 DIAGNOSIS — I48.19 PERSISTENT ATRIAL FIBRILLATION: Primary | ICD-10-CM

## 2024-03-01 NOTE — PROGRESS NOTES
"Shalom Jarvis  1955  Home phone not available      03/01/2024    [facility] CARDIOLOGY     Junito, Tiarra Frost MD  [address] [place] [id number]    Chief Complaint   Patient presents with    Atrial Fibrillation       Problem List:     Atrial Fibrillation   CHADSVASC: 4 (age, CHF, HTN, DM), on Xarelto  11/23/21 ECV: unsuccessful with subsequent Amiodarone initiation   3/16/22 ECV successful while on Amiodarone   7/12/22 Tikosyn admision  NICM/CHF  [facility] 4/6/2018: Ventriculography 25-30%, No obstructive CAD   2018- Implant of [facility] DDD - ICD   Echo 5/3/21 LVEF 20-25%, severe global hypokinesis of the LV, LV chamber severely dilated.   8/24/21 LVEF 30-35%, mod LA dilation, Mild MR and TR  12/9/2021 upgrade to BIV ICD per Dr. Borrego   History of Conduction system disease   HTN  HLD  DM  COPD with home 2L 02 use at night   Chronic Back Pain   Chonic renal insufficiency with baseline creatinine 1.5.  Surgical History   Right total knee replacement.   Allergies  Allergies   Allergen Reactions    Dulaglutide Other (See Comments)     \"Affected the electrical system of my heart\"    Shellfish Allergy Anaphylaxis       Current Medications    Current Outpatient Medications:     atorvastatin (LIPITOR) 20 MG tablet, Take 1 tablet by mouth Daily., Disp: , Rfl:     bumetanide (BUMEX) 2 MG tablet, Take 1 tablet by mouth Daily., Disp: , Rfl:     carvedilol (COREG) 25 MG tablet, Take 1 tablet by mouth 2 (Two) Times a Day., Disp: , Rfl:     diazePAM (VALIUM) 10 MG tablet, Take 1 tablet by mouth At Night As Needed., Disp: , Rfl:     dofetilide (TIKOSYN) 500 MCG capsule, TAKE 1 CAPSULE BY MOUTH EVERY 12 HOURS, Disp: 180 capsule, Rfl: 2    DULoxetine (CYMBALTA) 60 MG capsule, Take 1 capsule by mouth Daily., Disp: , Rfl:     fenofibrate (TRICOR) 145 MG tablet, Take 1 tablet by mouth every night at bedtime., Disp: , Rfl:     Flomax 0.4 MG capsule 24 hr capsule, Take 1 capsule by mouth Daily., Disp: , Rfl:     " "insulin aspart (NovoLOG FlexPen) 100 UNIT/ML solution pen-injector sc pen, 3 (Three) Times a Day. 2 units for every 50 points over 150. TID., Disp: , Rfl:     Insulin Degludec (Tresiba FlexTouch) 200 UNIT/ML solution pen-injector pen injection, 55 Units every night at bedtime., Disp: , Rfl:     metFORMIN (GLUCOPHAGE) 1000 MG tablet, Take 1 tablet by mouth 2 (Two) Times a Day With Meals., Disp: , Rfl:     rivaroxaban (XARELTO) 20 MG tablet, Take 1 tablet by mouth Daily., Disp: , Rfl: 0    sacubitril-valsartan (Entresto) 24-26 MG tablet, Take 1 tablet by mouth 2 (Two) Times a Day., Disp: 60 tablet, Rfl: 6    spironolactone (ALDACTONE) 25 MG tablet, Take 1 tablet by mouth 2 (Two) Times a Day., Disp: , Rfl:     History of Present Illness     Pt presents for follow up of AF/VT/DCM/CHF/HTRN . Since the pt has seen us, pt overall is done reasonly well.  No hospitalizations, ER visits, ICD discharges or TIA/CVAs.  Does have chronic dyspnea on exertion which he contributes to his underlying lung disease. Denies any palpitations,  CP, near-syncope or syncope.  States he has been compliant with his medical therapy.  Is concerned that he might lose his ability to use oxygen at home as his insurance may not cover the cost anymore.  Blood pressure has been stable.      Vitals:    03/01/24 1258   BP: 114/66   BP Location: Left arm   Patient Position: Sitting   Pulse: 86   SpO2: 96%   Weight: (!) 152 kg (335 lb)   Height: 190.5 cm (75\")       PE:  General: NAD  Neck: no JVD, no carotid bruits, no TM  Heart RRR, NL S1, S2, S4 present, no rubs, murmurs  Lungs: CTA, no wheezes, rhonchi, or rales  Abd: soft, non-tender, NL BS  Ext: No musculoskeletal deformities, no edema, cyanosis, or clubbing  Psych: normal mood and affect    Diagnostic Data:    ECG 12 Lead    Date/Time: 3/1/2024 1:15 PM  Performed by: Freddy Burgess MD    Authorized by: Freddy Burgess MD  Comparison: compared with previous ECG from 9/28/2023  Similar to " previous ECG  Rhythm: sinus rhythm and paced  Ectopy: atrial premature contractions  BPM: 86        Manual Device Interrogation: [employer] bivicd.  DDD.  A paced 11%.  BiV paced 100%.  Normal thresholds impedance.  Charge impedance 81 ohms.  Battery voltage is 9 years.  Less than 1% mode switch.  Add rate responsiveness.    1. Persistent atrial fibrillation    2. Chronic systolic congestive heart failure    3. Cardiomyopathy, dilated    4. Primary hypertension            Plan:    1. Persistent Atrial Fibrillation on tikosyn: QT stable. No significant AF on PM check doing well from an arrhythmia standpoint.  -CHADSVASC: 4 on xarelto.      2.  NICM/ Chronic Systolic Heart Failure/Class II-III CHF  -8/24/21 LVEF 30-35%, mod LA dilation, Mild MR and TR  -Upgrade to BiV ICD in December 2021, no shocks.  Recent LV upgrade due to lead malfunction Dr. Burgess June 2023  -On Guideline Directed Medical including Coreg, Entresto, Bumex, Aldactone  Per Dr. Isaiah Borrego.  Would like for the patient have a repeat echocardiogram when he sees Dr. Borrego for further assessment of his LVEF     3. HTN: Presently stable.       F/up in 6 months

## 2024-07-10 RX ORDER — SACUBITRIL AND VALSARTAN 24; 26 MG/1; MG/1
1 TABLET, FILM COATED ORAL 2 TIMES DAILY
Qty: 60 TABLET | Refills: 6 | Status: SHIPPED | OUTPATIENT
Start: 2024-07-10

## 2024-11-18 RX ORDER — DOFETILIDE 0.5 MG/1
CAPSULE ORAL
Qty: 180 CAPSULE | Refills: 2 | Status: SHIPPED | OUTPATIENT
Start: 2024-11-18

## 2025-07-11 ENCOUNTER — OFFICE VISIT (OUTPATIENT)
Dept: CARDIOLOGY | Facility: CLINIC | Age: 70
End: 2025-07-11
Payer: MEDICARE

## 2025-07-11 VITALS
OXYGEN SATURATION: 95 % | WEIGHT: 315 LBS | SYSTOLIC BLOOD PRESSURE: 120 MMHG | HEIGHT: 75 IN | BODY MASS INDEX: 39.17 KG/M2 | HEART RATE: 100 BPM | DIASTOLIC BLOOD PRESSURE: 84 MMHG

## 2025-07-11 DIAGNOSIS — I42.0 CARDIOMYOPATHY, DILATED: ICD-10-CM

## 2025-07-11 DIAGNOSIS — I50.22 CHRONIC SYSTOLIC CONGESTIVE HEART FAILURE: ICD-10-CM

## 2025-07-11 DIAGNOSIS — I10 ESSENTIAL HYPERTENSION: ICD-10-CM

## 2025-07-11 DIAGNOSIS — I48.19 ATRIAL FIBRILLATION, PERSISTENT: Primary | ICD-10-CM

## 2025-07-11 RX ORDER — BUSPIRONE HYDROCHLORIDE 10 MG/1
10 TABLET ORAL 3 TIMES DAILY
COMMUNITY

## 2025-07-11 RX ORDER — TAMSULOSIN HYDROCHLORIDE 0.4 MG/1
1 CAPSULE ORAL DAILY
COMMUNITY

## 2025-07-11 NOTE — PROGRESS NOTES
"Shalom Jarvis  1955  Home phone not available    07/11/2025    [facility] CARDIOLOGY     Referring Provider: No ref. provider found     Tiarra Wild MD  [address] [place] [id number]    Chief Complaint   Patient presents with    Persistent atrial fibrillation       Problem List:   Atrial Fibrillation   CHADSVASC: 4 (age, CHF, HTN, DM), on Xarelto  11/23/21 ECV: unsuccessful with subsequent Amiodarone initiation   3/16/22 ECV successful while on Amiodarone   7/12/22 Tikosyn admision  NICM/CHF  [facility] 4/6/2018: Ventriculography 25-30%, No obstructive CAD   2018- Implant of BSC DDD - ICD   Echo 5/3/21 LVEF 20-25%, severe global hypokinesis of the LV, LV chamber severely dilated.   8/24/21 LVEF 30-35%, mod LA dilation, Mild MR and TR  12/9/2021 upgrade to BIV ICD per Dr. Borrego   Echo 3/17/2023: EF 35-40%, minimal MR and TR noted  ICD generator change out 6/30/2023 with insertion of a new coronary sinus pacer lead  History of Conduction system disease   HTN  HLD  DM  COPD with home 2L 02 use at night   Chronic Back Pain   Chonic renal insufficiency with baseline creatinine 1.5.  Surgical History   Right total knee replacement.     Allergies  Allergies   Allergen Reactions    Dulaglutide Other (See Comments)     \"Affected the electrical system of my heart\"    Shellfish Allergy Anaphylaxis       Current Medications    Current Outpatient Medications:     atorvastatin (LIPITOR) 20 MG tablet, Take 1 tablet by mouth Daily., Disp: , Rfl:     bumetanide (BUMEX) 2 MG tablet, Take 1 tablet by mouth Daily., Disp: , Rfl:     busPIRone (BUSPAR) 10 MG tablet, Take 1 tablet by mouth 3 (Three) Times a Day., Disp: , Rfl:     carvedilol (COREG) 25 MG tablet, Take 1 tablet by mouth 2 (Two) Times a Day., Disp: , Rfl:     diazePAM (VALIUM) 10 MG tablet, Take 1 tablet by mouth At Night As Needed., Disp: , Rfl:     dofetilide (TIKOSYN) 500 MCG capsule, TAKE 1 CAPSULE BY MOUTH EVERY 12 HOURS, Disp: " "180 capsule, Rfl: 2    DULoxetine (CYMBALTA) 60 MG capsule, Take 1 capsule by mouth Daily., Disp: , Rfl:     Entresto 24-26 MG tablet, TAKE 1 TABLET BY MOUTH TWICE A DAY, Disp: 60 tablet, Rfl: 6    fenofibrate (TRICOR) 145 MG tablet, Take 1 tablet by mouth every night at bedtime., Disp: , Rfl:     insulin aspart (NovoLOG FlexPen) 100 UNIT/ML solution pen-injector sc pen, 3 (Three) Times a Day. 2 units for every 50 points over 150. TID., Disp: , Rfl:     Insulin Degludec (Tresiba FlexTouch) 200 UNIT/ML solution pen-injector pen injection, 55 Units every night at bedtime., Disp: , Rfl:     metFORMIN (GLUCOPHAGE) 1000 MG tablet, Take 1 tablet by mouth 2 (Two) Times a Day With Meals., Disp: , Rfl:     rivaroxaban (XARELTO) 20 MG tablet, Take 1 tablet by mouth Daily., Disp: , Rfl: 0    spironolactone (ALDACTONE) 25 MG tablet, Take 1 tablet by mouth 2 (Two) Times a Day., Disp: , Rfl:     tamsulosin (FLOMAX) 0.4 MG capsule 24 hr capsule, Take 1 capsule by mouth Daily., Disp: , Rfl:     History of Present Illness     Pt presents for follow up of AF/VT/ICD. Since we last saw the pt, pt has done well all things considered.  He has not had no hospitalizations or ER visits.  He apparently has upcoming cervical neck surgery for pain in his left arm and numbness.  His shortness of breath has been chronic and unchanged.  No active ongoing chest pain.  No near-syncope or syncope.  Does note lightheadedness on occasion.  Unaware of any significant tachypalpitations.  No ICD discharges.  Blood pressure been well-controlled.  Been compliant with his medical therapy.  No bleeding on Xarelto.        Vitals:    07/11/25 1329   BP: 120/84   BP Location: Left arm   Patient Position: Sitting   Pulse: 100   SpO2: 95%   Weight: (!) 145 kg (319 lb 9.6 oz)   Height: 190.5 cm (75\")     Body mass index is 39.95 kg/m².  PE:  General: NAD  Neck: no JVD, no carotid bruits, no TM  Heart RRR, NL S1, S2, S4 present, no rubs, murmurs  Lungs decreased " breath sounds right greater than left with wheezes  Abd: soft, non-tender, NL BS  Ext: No musculoskeletal deformities, no edema, cyanosis,.  Clubbing with yellow fingernails are noted.  Psych: normal mood and affect    Diagnostic Data:    ICD interrogation demonstrates normal BiV ICD function.  100% RV LV paced.  16% right atrial paced.  Thresholds stable.  10 years battery voltage.  No atrial fibrillation or ventricular events          ECG 12 Lead    Date/Time: 7/11/2025 1:51 PM  Performed by: Freddy Burgess MD    Authorized by: Freddy Burgess MD  Comparison: compared with previous ECG from 3/1/2024  Similar to previous ECG  Rhythm: sinus rhythm and paced  BPM: 72          Advance Care Planning   ACP discussion was held with the patient during this visit. Patient has an advance directive in EMR which is still valid.        1. Atrial fibrillation, persistent    2. Chronic systolic congestive heart failure    3. Cardiomyopathy, dilated    4. Essential hypertension          Plan:    1. Persistent Atrial Fibrillation on tikosyn: QT stable. No significant AF on ICD check doing well from an arrhythmia standpoint.  -CHADSVASC: 4 on xarelto.      2.  NICM/ Chronic Systolic Heart Failure/Class II-III CHF  -8/24/21 LVEF 30-35%, mod LA dilation, Mild MR and TR  -Upgrade to BiV ICD in December 2021, no shocks.  Recent LV upgrade due to lead malfunction Dr. Burgess June 2023  -On Guideline Directed Medical including Coreg, Entresto, Bumex, Aldactone  Per Dr. Isaiah Borrego.      3. HTN: Presently stable.      In regards to his upcoming cervical neck surgery, he should hold his Xarelto 3 days prior to the procedure.    F/up in 6 months